# Patient Record
Sex: MALE | Race: WHITE | Employment: FULL TIME | URBAN - METROPOLITAN AREA
[De-identification: names, ages, dates, MRNs, and addresses within clinical notes are randomized per-mention and may not be internally consistent; named-entity substitution may affect disease eponyms.]

---

## 2018-04-11 ENCOUNTER — HOSPITAL ENCOUNTER (EMERGENCY)
Age: 58
Discharge: STILL ADMITTED OR ADMINISTRATIVE | End: 2018-04-11
Attending: EMERGENCY MEDICINE

## 2018-04-11 PROCEDURE — 75810000275 HC EMERGENCY DEPT VISIT NO LEVEL OF CARE: Performed by: EMERGENCY MEDICINE

## 2018-04-12 PROCEDURE — 75810000275 HC EMERGENCY DEPT VISIT NO LEVEL OF CARE: Performed by: EMERGENCY MEDICINE

## 2019-06-26 RX ORDER — SODIUM CHLORIDE 0.9 % (FLUSH) 0.9 %
5-40 SYRINGE (ML) INJECTION AS NEEDED
Status: CANCELLED | OUTPATIENT
Start: 2019-06-26

## 2019-06-26 RX ORDER — SODIUM CHLORIDE 0.9 % (FLUSH) 0.9 %
5-40 SYRINGE (ML) INJECTION EVERY 8 HOURS
Status: CANCELLED | OUTPATIENT
Start: 2019-06-26

## 2019-06-27 ENCOUNTER — ANESTHESIA EVENT (OUTPATIENT)
Dept: SURGERY | Age: 59
End: 2019-06-27
Payer: COMMERCIAL

## 2019-06-28 ENCOUNTER — HOSPITAL ENCOUNTER (OUTPATIENT)
Age: 59
Setting detail: OUTPATIENT SURGERY
Discharge: HOME | End: 2019-06-28
Attending: ORTHOPAEDIC SURGERY | Admitting: ORTHOPAEDIC SURGERY
Payer: COMMERCIAL

## 2019-06-28 ENCOUNTER — ANESTHESIA (OUTPATIENT)
Dept: SURGERY | Age: 59
End: 2019-06-28
Payer: COMMERCIAL

## 2019-06-28 VITALS
HEART RATE: 98 BPM | RESPIRATION RATE: 16 BRPM | OXYGEN SATURATION: 98 % | TEMPERATURE: 98 F | BODY MASS INDEX: 29.71 KG/M2 | SYSTOLIC BLOOD PRESSURE: 145 MMHG | WEIGHT: 213 LBS | DIASTOLIC BLOOD PRESSURE: 78 MMHG

## 2019-06-28 PROCEDURE — C1713 ANCHOR/SCREW BN/BN,TIS/BN: HCPCS | Performed by: ORTHOPAEDIC SURGERY

## 2019-06-28 PROCEDURE — C1762 CONN TISS, HUMAN(INC FASCIA): HCPCS | Performed by: ORTHOPAEDIC SURGERY

## 2019-06-28 PROCEDURE — 77030037278 HC KT QFIX DRL GDE OBTR DISP -C: Performed by: ORTHOPAEDIC SURGERY

## 2019-06-28 PROCEDURE — 77030002933 HC SUT MCRYL J&J -A: Performed by: ORTHOPAEDIC SURGERY

## 2019-06-28 PROCEDURE — 77030006788 HC BLD SAW OSC STRY -B: Performed by: ORTHOPAEDIC SURGERY

## 2019-06-28 PROCEDURE — 77030031139 HC SUT VCRL2 J&J -A: Performed by: ORTHOPAEDIC SURGERY

## 2019-06-28 PROCEDURE — 77030006590 HC BLD ARTHSC GRFT J&J -C: Performed by: ORTHOPAEDIC SURGERY

## 2019-06-28 PROCEDURE — 76010010054 HC POST OP PAIN BLOCK: Performed by: ORTHOPAEDIC SURGERY

## 2019-06-28 PROCEDURE — 74011250636 HC RX REV CODE- 250/636: Performed by: ANESTHESIOLOGY

## 2019-06-28 PROCEDURE — 76210000020 HC REC RM PH II FIRST 0.5 HR: Performed by: ORTHOPAEDIC SURGERY

## 2019-06-28 PROCEDURE — 74011250636 HC RX REV CODE- 250/636

## 2019-06-28 PROCEDURE — 76210000063 HC OR PH I REC FIRST 0.5 HR: Performed by: ORTHOPAEDIC SURGERY

## 2019-06-28 PROCEDURE — 77030004453 HC BUR SHV STRY -B: Performed by: ORTHOPAEDIC SURGERY

## 2019-06-28 PROCEDURE — 76010000171 HC OR TIME 2 TO 2.5 HR INTENSV-TIER 1: Performed by: ORTHOPAEDIC SURGERY

## 2019-06-28 PROCEDURE — 76060000035 HC ANESTHESIA 2 TO 2.5 HR: Performed by: ORTHOPAEDIC SURGERY

## 2019-06-28 PROCEDURE — 77030010430: Performed by: ORTHOPAEDIC SURGERY

## 2019-06-28 PROCEDURE — 77030027384 HC PRB ARTHSCP SERFAS STRY -C: Performed by: ORTHOPAEDIC SURGERY

## 2019-06-28 PROCEDURE — 77030006891 HC BLD SHV RESECT STRY -B: Performed by: ORTHOPAEDIC SURGERY

## 2019-06-28 PROCEDURE — 77030018836 HC SOL IRR NACL ICUM -A: Performed by: ORTHOPAEDIC SURGERY

## 2019-06-28 PROCEDURE — 77030002966 HC SUT PDS J&J -A: Performed by: ORTHOPAEDIC SURGERY

## 2019-06-28 PROCEDURE — 77030000032 HC CUF TRNQT ZIMM -B: Performed by: ORTHOPAEDIC SURGERY

## 2019-06-28 PROCEDURE — 74011250636 HC RX REV CODE- 250/636: Performed by: ORTHOPAEDIC SURGERY

## 2019-06-28 PROCEDURE — 76942 ECHO GUIDE FOR BIOPSY: CPT | Performed by: ORTHOPAEDIC SURGERY

## 2019-06-28 PROCEDURE — 77030002982 HC SUT POLYSRB J&J -A: Performed by: ORTHOPAEDIC SURGERY

## 2019-06-28 PROCEDURE — 77030018986 HC SUT ETHBND4 J&J -B: Performed by: ORTHOPAEDIC SURGERY

## 2019-06-28 PROCEDURE — 77030029203 HC IRR KT CYSTO/TUR BBMI -A: Performed by: ORTHOPAEDIC SURGERY

## 2019-06-28 PROCEDURE — 77030003690 HC NDL TAPR ASPN -A: Performed by: ORTHOPAEDIC SURGERY

## 2019-06-28 DEVICE — 7 X 20 MM BIORCI SCREW
Type: IMPLANTABLE DEVICE | Site: KNEE | Status: FUNCTIONAL
Brand: BIORCI

## 2019-06-28 DEVICE — 2.8MM Q-FIX ALL SUTURE ANCHOR
Type: IMPLANTABLE DEVICE | Site: KNEE | Status: FUNCTIONAL
Brand: Q-FIX

## 2019-06-28 DEVICE — GRAFT BNE L10XW25XH10MM BNE TEND BNE PRESHAPED: Type: IMPLANTABLE DEVICE | Site: KNEE | Status: FUNCTIONAL

## 2019-06-28 RX ORDER — DEXAMETHASONE SODIUM PHOSPHATE 4 MG/ML
INJECTION, SOLUTION INTRA-ARTICULAR; INTRALESIONAL; INTRAMUSCULAR; INTRAVENOUS; SOFT TISSUE AS NEEDED
Status: DISCONTINUED | OUTPATIENT
Start: 2019-06-28 | End: 2019-06-28 | Stop reason: HOSPADM

## 2019-06-28 RX ORDER — HYDROCODONE BITARTRATE AND ACETAMINOPHEN 5; 325 MG/1; MG/1
2 TABLET ORAL AS NEEDED
Status: DISCONTINUED | OUTPATIENT
Start: 2019-06-28 | End: 2019-06-28 | Stop reason: HOSPADM

## 2019-06-28 RX ORDER — MIDAZOLAM HYDROCHLORIDE 1 MG/ML
2 INJECTION, SOLUTION INTRAMUSCULAR; INTRAVENOUS
Status: DISCONTINUED | OUTPATIENT
Start: 2019-06-28 | End: 2019-06-28 | Stop reason: HOSPADM

## 2019-06-28 RX ORDER — SODIUM CHLORIDE, SODIUM LACTATE, POTASSIUM CHLORIDE, CALCIUM CHLORIDE 600; 310; 30; 20 MG/100ML; MG/100ML; MG/100ML; MG/100ML
75 INJECTION, SOLUTION INTRAVENOUS CONTINUOUS
Status: DISCONTINUED | OUTPATIENT
Start: 2019-06-28 | End: 2019-06-28 | Stop reason: HOSPADM

## 2019-06-28 RX ORDER — SODIUM CHLORIDE 0.9 % (FLUSH) 0.9 %
5-40 SYRINGE (ML) INJECTION EVERY 8 HOURS
Status: DISCONTINUED | OUTPATIENT
Start: 2019-06-28 | End: 2019-06-28 | Stop reason: HOSPADM

## 2019-06-28 RX ORDER — DEXAMETHASONE SODIUM PHOSPHATE 4 MG/ML
INJECTION, SOLUTION INTRA-ARTICULAR; INTRALESIONAL; INTRAMUSCULAR; INTRAVENOUS; SOFT TISSUE
Status: COMPLETED | OUTPATIENT
Start: 2019-06-28 | End: 2019-06-28

## 2019-06-28 RX ORDER — MIDAZOLAM HYDROCHLORIDE 1 MG/ML
5 INJECTION, SOLUTION INTRAMUSCULAR; INTRAVENOUS ONCE
Status: COMPLETED | OUTPATIENT
Start: 2019-06-28 | End: 2019-06-28

## 2019-06-28 RX ORDER — PROPOFOL 10 MG/ML
INJECTION, EMULSION INTRAVENOUS AS NEEDED
Status: DISCONTINUED | OUTPATIENT
Start: 2019-06-28 | End: 2019-06-28 | Stop reason: HOSPADM

## 2019-06-28 RX ORDER — CEFAZOLIN SODIUM/WATER 2 G/20 ML
2 SYRINGE (ML) INTRAVENOUS ONCE
Status: COMPLETED | OUTPATIENT
Start: 2019-06-28 | End: 2019-06-28

## 2019-06-28 RX ORDER — HYDROMORPHONE HYDROCHLORIDE 2 MG/ML
0.5 INJECTION, SOLUTION INTRAMUSCULAR; INTRAVENOUS; SUBCUTANEOUS
Status: DISCONTINUED | OUTPATIENT
Start: 2019-06-28 | End: 2019-06-28 | Stop reason: HOSPADM

## 2019-06-28 RX ORDER — SODIUM CHLORIDE 0.9 % (FLUSH) 0.9 %
5-40 SYRINGE (ML) INJECTION AS NEEDED
Status: DISCONTINUED | OUTPATIENT
Start: 2019-06-28 | End: 2019-06-28 | Stop reason: HOSPADM

## 2019-06-28 RX ORDER — FENTANYL CITRATE 50 UG/ML
100 INJECTION, SOLUTION INTRAMUSCULAR; INTRAVENOUS ONCE
Status: COMPLETED | OUTPATIENT
Start: 2019-06-28 | End: 2019-06-28

## 2019-06-28 RX ORDER — LIDOCAINE HYDROCHLORIDE 20 MG/ML
INJECTION, SOLUTION EPIDURAL; INFILTRATION; INTRACAUDAL; PERINEURAL AS NEEDED
Status: DISCONTINUED | OUTPATIENT
Start: 2019-06-28 | End: 2019-06-28 | Stop reason: HOSPADM

## 2019-06-28 RX ORDER — FENTANYL CITRATE 50 UG/ML
INJECTION, SOLUTION INTRAMUSCULAR; INTRAVENOUS AS NEEDED
Status: DISCONTINUED | OUTPATIENT
Start: 2019-06-28 | End: 2019-06-28 | Stop reason: HOSPADM

## 2019-06-28 RX ORDER — OXYCODONE HYDROCHLORIDE 5 MG/1
5 TABLET ORAL
Status: DISCONTINUED | OUTPATIENT
Start: 2019-06-28 | End: 2019-06-28 | Stop reason: HOSPADM

## 2019-06-28 RX ORDER — LIDOCAINE HYDROCHLORIDE 10 MG/ML
0.1 INJECTION INFILTRATION; PERINEURAL AS NEEDED
Status: DISCONTINUED | OUTPATIENT
Start: 2019-06-28 | End: 2019-06-28 | Stop reason: HOSPADM

## 2019-06-28 RX ORDER — ONDANSETRON 2 MG/ML
INJECTION INTRAMUSCULAR; INTRAVENOUS AS NEEDED
Status: DISCONTINUED | OUTPATIENT
Start: 2019-06-28 | End: 2019-06-28 | Stop reason: HOSPADM

## 2019-06-28 RX ADMIN — SODIUM CHLORIDE, SODIUM LACTATE, POTASSIUM CHLORIDE, AND CALCIUM CHLORIDE 75 ML/HR: 600; 310; 30; 20 INJECTION, SOLUTION INTRAVENOUS at 13:21

## 2019-06-28 RX ADMIN — DEXAMETHASONE SODIUM PHOSPHATE 4 MG: 4 INJECTION, SOLUTION INTRA-ARTICULAR; INTRALESIONAL; INTRAMUSCULAR; INTRAVENOUS; SOFT TISSUE at 09:47

## 2019-06-28 RX ADMIN — Medication 2 G: at 10:45

## 2019-06-28 RX ADMIN — DEXAMETHASONE SODIUM PHOSPHATE 10 MG: 4 INJECTION, SOLUTION INTRA-ARTICULAR; INTRALESIONAL; INTRAMUSCULAR; INTRAVENOUS; SOFT TISSUE at 10:52

## 2019-06-28 RX ADMIN — LIDOCAINE HYDROCHLORIDE 100 MG: 20 INJECTION, SOLUTION EPIDURAL; INFILTRATION; INTRACAUDAL; PERINEURAL at 10:34

## 2019-06-28 RX ADMIN — MIDAZOLAM 5 MG: 1 INJECTION INTRAMUSCULAR; INTRAVENOUS at 09:38

## 2019-06-28 RX ADMIN — SODIUM CHLORIDE, SODIUM LACTATE, POTASSIUM CHLORIDE, AND CALCIUM CHLORIDE: 600; 310; 30; 20 INJECTION, SOLUTION INTRAVENOUS at 11:31

## 2019-06-28 RX ADMIN — FENTANYL CITRATE 50 MCG: 50 INJECTION INTRAMUSCULAR; INTRAVENOUS at 09:38

## 2019-06-28 RX ADMIN — DEXAMETHASONE SODIUM PHOSPHATE 10 MG: 4 INJECTION, SOLUTION INTRA-ARTICULAR; INTRALESIONAL; INTRAMUSCULAR; INTRAVENOUS; SOFT TISSUE at 10:50

## 2019-06-28 RX ADMIN — FENTANYL CITRATE 25 MCG: 50 INJECTION, SOLUTION INTRAMUSCULAR; INTRAVENOUS at 11:57

## 2019-06-28 RX ADMIN — SODIUM CHLORIDE, SODIUM LACTATE, POTASSIUM CHLORIDE, AND CALCIUM CHLORIDE 75 ML/HR: 600; 310; 30; 20 INJECTION, SOLUTION INTRAVENOUS at 09:14

## 2019-06-28 RX ADMIN — PROPOFOL 200 MG: 10 INJECTION, EMULSION INTRAVENOUS at 10:34

## 2019-06-28 RX ADMIN — FENTANYL CITRATE 25 MCG: 50 INJECTION, SOLUTION INTRAMUSCULAR; INTRAVENOUS at 12:17

## 2019-06-28 RX ADMIN — SODIUM CHLORIDE, SODIUM LACTATE, POTASSIUM CHLORIDE, AND CALCIUM CHLORIDE: 600; 310; 30; 20 INJECTION, SOLUTION INTRAVENOUS at 10:28

## 2019-06-28 RX ADMIN — FENTANYL CITRATE 25 MCG: 50 INJECTION, SOLUTION INTRAMUSCULAR; INTRAVENOUS at 11:26

## 2019-06-28 RX ADMIN — ONDANSETRON 4 MG: 2 INJECTION INTRAMUSCULAR; INTRAVENOUS at 10:52

## 2019-06-28 RX ADMIN — FENTANYL CITRATE 25 MCG: 50 INJECTION, SOLUTION INTRAMUSCULAR; INTRAVENOUS at 10:50

## 2019-06-28 NOTE — BRIEF OP NOTE
BRIEF OPERATIVE NOTE Date of Procedure: 6/28/2019 Preoperative Diagnosis: Sprain of anterior cruciate ligament of right knee, initial encounter [O34.426O] Sprain of medial collateral ligament of right knee, initial encounter [S08.743H] Complex tear of lateral meniscus of right knee as current injury, initial encounter [S83.271A] Postoperative Diagnosis: Tear of anterior cruciate ligament of right knee, initial encounter [E16.755J] Tear of medial collateral ligament of right knee, initial encounter [L29.840E] Complex tear of lateral meniscus of right knee as current injury, initial encounter [S83.271A] Procedure(s): RIGHT KNEE ARTHROSCOPY WITH ANTERIOR CRUCIATE LIGAMENT RECONSTRUCTION WITH ALLOGRAFT / MEDIAL COLLATERAL LIGAMENT REPAIR / LATERAL MENISCECTOMY Surgeon(s) and Role: Master García MD - Primary Surgical Assistant:  
 
 
 
Surgical Staff: 
Circ-1: Jason Wade RN 
Circ-Relief: Queen Renny RN Scrub Tech-1: Muriel Moncada Scrub Tech-2: Stephen Chao Event Time In Time Out Incision Start 1055 Incision Close 1220 Anesthesia: General  
Estimated Blood Loss:  
 
Specimens: * No specimens in log * Findings:  
  
Complications:  
 
Implants:  
Implant Name Type Inv. Item Serial No.  Lot No. LRB No. Used Action BONE TEND BNE [facility] PRE [facility] -- 10/10/35-45MM - [id number]  BONE TEND BNE [facility] PRE [facility] -- 10/10/35-45MM [id number] [employer] [id number] Right 1 Implanted SCR INTFR Vonita Schiller --  - [id number]  SCR INTFR Augustin Javi [employer] [id number] Right 1 Implanted SCR INTFR Vonita Schiller --  - [id number]  SCR INTFR [facility] Javi [employer] [id number] Right 1 Implanted ANCHOR SUT EXP DISP 2.8MM STRL -- Q-FIX - [id number]  ANCHOR SUT EXP DISP 2.8MM STRL -- [place]azal [place] [employer] [id number] Right 1 Implanted

## 2019-06-28 NOTE — OP NOTES
[facility]  OPERATIVE REPORT    Name:  Mellisa Robbins  MR#:  [id number]  :  1960  ACCOUNT #:  [de-identified]  DATE OF SERVICE:  2019    PREOPERATIVE DIAGNOSES:  Right anterior cruciate ligament tear with medial collateral ligament tear, and bucket-handle tear of the lateral meniscus. 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 PROCEDURE PERFORMED:  1. Arthroscopic-assisted ACL reconstruction using central third of the bone-tendon-bone allograft. 2.  Repair of the medial collateral ligament. 3.  Lateral meniscectomy. 4.  Chondroplasty of medial femoral condyle. SURGEON:  Donna Roche MD    ASSISTANT:  None. ANESTHESIA:  General.    COMPLICATIONS:  None. SPECIMENS REMOVED:  None. IMPLANTS:  None. ESTIMATED BLOOD LOSS:  20 mL    PROCEDURE:  After an adequate level of general anesthesia was obtained, the right leg was prepped and draped in the usual sterile fashion. A tourniquet was used for the procedure. He had a block per Anesthesia for postop pain management. He had a tear of the MCL and ACL with a markedly positive anterior drawer and Lachman. It opened some at the extension. He had no posterior sagging or increased external rotation. He had an avulsion of the MCL off the femur but he did have good range of motion. The knee was arthroscoped and the patient had just minimal chondromalacia of the undersurface of the patella. This was performed through anteromedial and anterolateral portals. The patient had a complete tear of the ACL. He had a large displaced bucket-handle tear from the anterior horn to the posterior horn that flipped into the joint, and unfortunately, it had a extended into the periphery. It was not repairable. It was more of a complex tear.   It was released at the axillas and then removed. There was just minimal chondromalacia laterally. There were no retained stumps. A notchplasty was performed. The old ACL was debrided. The ACL footprint at the over-the-top position was identified. 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 The patient also had an MCL tear off the femur. The graft was prepared on the back table. The over-the-top position and posterior wall identified as well as the ACL footprint and the graft was prepared on the back table. A tibial tunnel was drilled to 10 mm. He had a very sharp spine in the intercondylar notch that was removed to facilitate exposure. The tibial tunnel was drilled to 10 mm. The edges were chamfered. Just anterior to the posterior cortex, a guidewire was placed and drilled to 10 mm. Soft tissue and debris was then removed and the graft had been prepared on the back table using an allograft central third of the bone-tendon-bone. The graft was pulled snuggly into the two sites and excellent fixation with Smith and Nephew absorbable suture anchors. There was excellent fixation on both ends and this stabilized the knee significantly, even improving some of the laxity in full extension and at 30 degrees with valgus stress. He is very stable. Photos were made for the patient. The wound was then copiously irrigated. Then I elected to repair the MCL which was avulsed off the femur. An incision was made over the medial epicondyle area. Dissection was carried down through the subcutaneous tissue. The fascia was opened and then the tear of the MCL off the femur was exposed. It was elected perform a repair using the [facility] quick fix system. Suture anchor was placed snuggly into the bone. This was double loaded. Sutures were then placed into the MCL and repair was performed.   His knee was much more stable after the repair and there was good tension on the MCL and this was performed to the knee in about 30-40 degrees. His knee was stable in extension and at flexion. Once again, at the end of the procedure, the ACL reconstruction was evaluated and was very stable. The tourniquet was released. Hemostasis was obtained. The deep tissue was closed with Vicryl, subcutaneous tissue with Monocryl, and Monocryl subcuticular stitches placed. A running subcuticular nylon suture was placed. Steri-Strips and sterile dressings were then applied. The leg was placed in a hinged knee brace locked in full extension. Instructions were given to family. He tolerated the procedure well.       MD CHLOE [id number]  D:  06/28/2019 12:30  T:  06/28/2019 12:39  JOB #:  [id number]  CC:  [id number] [facility]

## 2019-06-28 NOTE — ANESTHESIA POSTPROCEDURE EVALUATION
Procedure(s):  RIGHT KNEE ARTHROSCOPY WITH ANTERIOR CRUCIATE LIGAMENT RECONSTRUCTION WITH ALLOGRAFT / MEDIAL COLLATERAL LIGAMENT REPAIR / LATERAL MENISCECTOMY. general    Anesthesia Post Evaluation      Multimodal analgesia: multimodal analgesia used between 6 hours prior to anesthesia start to PACU discharge  Patient location during evaluation: bedside  Patient participation: complete - patient participated  Level of consciousness: awake and alert  Pain score: 3  Pain management: adequate  Airway patency: patent  Anesthetic complications: no  Cardiovascular status: acceptable and hemodynamically stable  Respiratory status: acceptable  Hydration status: acceptable  Post anesthesia nausea and vomiting:  none      Vitals Value Taken Time   /66 6/28/2019  1:51 PM   Temp 36.6 °C (97.9 °F) 6/28/2019 12:42 PM   Pulse 90 6/28/2019  1:51 PM   Resp 16 6/28/2019  1:11 PM   SpO2 96 % 6/28/2019  1:51 PM   Vitals shown include unvalidated device data.

## 2019-06-28 NOTE — H&P
Outpatient Surgery History and Physical:  Lani Zarco was seen and examined. CHIEF COMPLAINT:    r knee . PE:   There were no vitals taken for this visit. Heart:   Regular rhythm      Lungs:  Are clear      Past Medical History: There are no active problems to display for this patient. Surgical History: No past surgical history on file. Social History: Patient  reports that he has never smoked. He has never used smokeless tobacco. He reports that he does not drink alcohol or use drugs. Family History:   Family History   Problem Relation Age of Onset    Heart Disease Father        Allergies: Reviewed per EMR  Allergies   Allergen Reactions    Pcn [Penicillins] Other (comments)     Headaches         Medications:    No current facility-administered medications on file prior to encounter. Current Outpatient Medications on File Prior to Encounter   Medication Sig    aspirin 81 mg tablet Take 81 mg by mouth. The surgery is planned for the  knee. History and physical has been reviewed. The patient has been examined. There have been no significant clinical changes since the completion of the originally dated History and Physical.  Patient identified by surgeon; surgical site was confirmed by patient and surgeon. The patient is here today for outpatient surgery. I have examined the patient, no changes are noted in the patient's medical status. Necessity for the procedure/care is still present and the history and physical above is current. See the office notes for the full long term history of the problem. Please see the recent office notes for the musculoskeletal examination.     Signed By: Pardeep Castellanos MD     June 28, 2019 6:55 AM

## 2019-06-28 NOTE — DISCHARGE INSTRUCTIONS
ACTIVITY  · As tolerated and as directed by your doctor. · Bathe or shower as directed by your doctor. DIET  · Clear liquids until no nausea or vomiting; then light diet for the first day. · Advance to regular diet on second day, unless your doctor orders otherwise. · If nausea and vomiting continues, call your doctor. PAIN  · Take pain medication as directed by your doctor. · Call your doctor if pain is NOT relieved by medication. · DO NOT take aspirin of blood thinners unless directed by your doctor. DRESSING CARE       CALL YOUR DOCTOR IF   · Excessive bleeding that does not stop after holding pressure over the area  · Temperature of 101 degrees F or above  · Excessive redness, swelling or bruising, and/ or green or yellow, smelly discharge from incision    AFTER ANESTHESIA   · For the first 24 hours: DO NOT Drive, Drink alcoholic beverages, or Make important decisions. · Be aware of dizziness following anesthesia and while taking pain medication. APPOINTMENT DATE/ TIME    YOUR DOCTOR'S PHONE NUMBER       DISCHARGE SUMMARY from Nurse    PATIENT INSTRUCTIONS:    After general anesthesia or intravenous sedation, for 24 hours or while taking prescription Narcotics:  · Limit your activities  · Do not drive and operate hazardous machinery  · Do not make important personal or business decisions  · Do  not drink alcoholic beverages  · If you have not urinated within 8 hours after discharge, please contact your surgeon on call. *  Please give a list of your current medications to your Primary Care Provider. *  Please update this list whenever your medications are discontinued, doses are      changed, or new medications (including over-the-counter products) are added. *  Please carry medication information at all times in case of emergency situations.       These are general instructions for a healthy lifestyle:    No smoking/ No tobacco products/ Avoid exposure to second hand smoke    Surgeon General's Warning:  Quitting smoking now greatly reduces serious risk to your health. Obesity, smoking, and sedentary lifestyle greatly increases your risk for illness    A healthy diet, regular physical exercise & weight monitoring are important for maintaining a healthy lifestyle    You may be retaining fluid if you have a history of heart failure or if you experience any of the following symptoms:  Weight gain of 3 pounds or more overnight or 5 pounds in a week, increased swelling in our hands or feet or shortness of breath while lying flat in bed. Please call your doctor as soon as you notice any of these symptoms; do not wait until your next office visit. Recognize signs and symptoms of STROKE:    F-face looks uneven    A-arms unable to move or move unevenly    S-speech slurred or non-existent    T-time-call 911 as soon as signs and symptoms begin-DO NOT go       Back to bed or wait to see if you get better-TIME IS BRAIN. Post-Operative Instructions   For  Anterior Cruciate Ligament Reconstruction  Phone:  [phone]    1. Unless otherwise instructed, you may place as much weight as tolerated on the operated leg. Use crutches to help with ambulation. 2.  If you do not have an \"Iceman\" type cooling unit, for the first 48-72 hours following surgery, use ice on the knee every two hours (while awake) for 20-30 minutes at a time to help prevent swelling and lessen pain. If you have a cooling unit, follow the instructions given to you- continually as much as possible the first 48-72 hours, then 3-4 times a day for 4 weeks. Elevate leg. 3. You have been given a hinged brace. Keep the brace locked in a straight position at all times until you begin therapy. 4. You may remove the brace to shower and wrap the dressings to keep them dry. 5. Use any pain medication as instructed. You should take your pain medication as soon as you feel the anesthetic wearing off.   Do not wait until you are in severe pain to begin taking your pain medication. 6. You may have some side effects from your pain medication. If you have nausea, try taking your medication with food. For itching, you may take over the counter Benadryl. 7. Begin therapy as ordered    8. You may have been given a prescription for Zofran or Phenergan. This medication is used for nausea and vomiting. You do not need to get this prescription filled unless you have a problem. 9. If you have a problem, please call [address] [place] [address] at [phone]    Ruma Adame MD    [address] [place] [address], P.A[facility] Post-Operative Instructions   For  Anterior Cruciate Ligament Reconstruction  Phone:  [phone]    1. Unless otherwise instructed, you may place as much weight as tolerated on the operated leg. Use crutches to help with ambulation. 2.  If you do not have an \"Iceman\" type cooling unit, for the first 48-72 hours following surgery, use ice on the knee every two hours (while awake) for 20-30 minutes at a time to help prevent swelling and lessen pain. If you have a cooling unit, follow the instructions given to you- continually as much as possible the first 48-72 hours, then 3-4 times a day for 4 weeks. Elevate leg. 3. You have been given a hinged brace. Keep the brace locked in a straight position at all times until you begin therapy. 4. You may remove the brace to shower and wrap the dressings to keep them dry. 5. Use any pain medication as instructed. You should take your pain medication as soon as you feel the anesthetic wearing off. Do not wait until you are in severe pain to begin taking your pain medication. 6. You may have some side effects from your pain medication. If you have nausea, try taking your medication with food. For itching, you may take over the counter Benadryl. 7. Begin therapy as ordered    8.  You may have been given a prescription for Zofran or Phenergan. This medication is used for nausea and vomiting. You do not need to get this prescription filled unless you have a problem. 9. If you have a problem, please call [address] [place] [address] at [phone]    Edward Wolff MD    [facility]

## 2019-06-28 NOTE — PERIOP NOTES
Instructions to wife and daughter. Right knee dressing is dry and intact. brace is dry and intact. Still no feelings or sensations to the right leg. bp is 145/76 upon discharge. Jocelyn Sneed Pt. Verbalize feeling better. skin is dry and warm to touch .

## 2019-06-28 NOTE — ANESTHESIA PROCEDURE NOTES
Peripheral Block    Start time: 6/28/2019 9:46 AM  End time: 6/28/2019 9:48 AM  Performed by: Marten Hammans., MD  Authorized by: Marten Hammans., MD       Pre-procedure:    Indications: at surgeon's request, post-op pain management and procedure for pain    Preanesthetic Checklist: patient identified, risks and benefits discussed, site marked, timeout performed, anesthesia consent given and patient being monitored    Timeout Time: 09:45          Block Type:   Block Type:  Femoral single shot  Laterality:  Right  Monitoring:  Standard ASA monitoring, responsive to questions, oxygen, continuous pulse ox, frequent vital sign checks and heart rate  Injection Technique:  Single shot  Procedures: ultrasound guided and nerve stimulator    Patient Position: supine  Prep: chlorhexidine    Location:  Upper thigh  Needle Type:  Stimuplex  Needle Gauge:  22 G  Needle Localization:  Anatomical landmarks, nerve stimulator and ultrasound guidance  Motor Response: minimal motor response >0.4 mA      Assessment:  Number of attempts:  1  Injection Assessment:  Ultrasound image on chart, no paresthesia, incremental injection every 5 mL, local visualized surrounding nerve on ultrasound, negative aspiration for blood and no intravascular symptoms  Patient tolerance:  Patient tolerated the procedure well with no immediate complications

## 2019-06-28 NOTE — ANESTHESIA PREPROCEDURE EVALUATION
Relevant Problems   No relevant active problems       Anesthetic History   No history of anesthetic complications            Review of Systems / Medical History  Patient summary reviewed and pertinent labs reviewed    Pulmonary  Within defined limits                 Neuro/Psych   Within defined limits           Cardiovascular  Within defined limits                Exercise tolerance: >4 METS     GI/Hepatic/Renal  Within defined limits              Endo/Other  Within defined limits           Other Findings              Physical Exam    Airway  Mallampati: II  TM Distance: 4 - 6 cm  Neck ROM: normal range of motion   Mouth opening: Normal     Cardiovascular  Regular rate and rhythm,  S1 and S2 normal,  no murmur, click, rub, or gallop             Dental         Pulmonary  Breath sounds clear to auscultation               Abdominal         Other Findings            Anesthetic Plan    ASA: 1  Anesthesia type: general      Post-op pain plan if not by surgeon: peripheral nerve block single    Induction: Intravenous  Anesthetic plan and risks discussed with: Patient and Spouse

## 2019-06-28 NOTE — ANESTHESIA PROCEDURE NOTES
Peripheral Block    Start time: 6/28/2019 9:40 AM  End time: 6/28/2019 9:44 AM  Performed by: Bassem Pierce MD  Authorized by: Bassem Pierce MD       Pre-procedure:    Indications: at surgeon's request, post-op pain management and procedure for pain    Preanesthetic Checklist: patient identified, risks and benefits discussed, site marked, timeout performed, anesthesia consent given and patient being monitored    Timeout Time: 09:38          Block Type:   Block Type:  Sciatic single shot  Laterality:  Right  Monitoring:  Standard ASA monitoring, responsive to questions, oxygen, continuous pulse ox, frequent vital sign checks and heart rate  Injection Technique:  Single shot  Procedures: ultrasound guided and nerve stimulator    Patient Position: right lateral decubitus  Prep: chlorhexidine    Location:  Mid thigh  Needle Type:  Stimuplex  Needle Gauge:  22 G  Needle Localization:  Nerve stimulator and ultrasound guidance  Motor Response: minimal motor response >0.4 mA      Assessment:  Number of attempts:  1  Injection Assessment:  Incremental injection every 5 mL, no paresthesia, ultrasound image on chart, no intravascular symptoms, negative aspiration for blood and local visualized surrounding nerve on ultrasound  Patient tolerance:  Patient tolerated the procedure well with no immediate complications

## 2019-12-05 PROBLEM — E78.2 MIXED HYPERLIPIDEMIA: Status: ACTIVE | Noted: 2019-12-05

## 2020-01-06 ENCOUNTER — HOSPITAL ENCOUNTER (OUTPATIENT)
Dept: SLEEP MEDICINE | Age: 60
Discharge: HOME | End: 2020-01-06
Payer: COMMERCIAL

## 2020-01-06 PROCEDURE — 95810 POLYSOM 6/> YRS 4/> PARAM: CPT

## 2020-01-07 ENCOUNTER — HOSPITAL ENCOUNTER (OUTPATIENT)
Dept: SLEEP MEDICINE | Age: 60
Discharge: HOME | End: 2020-01-07
Payer: COMMERCIAL

## 2020-01-07 PROCEDURE — 95811 POLYSOM 6/>YRS CPAP 4/> PARM: CPT

## 2020-01-22 PROBLEM — G47.33 OSA (OBSTRUCTIVE SLEEP APNEA): Status: ACTIVE | Noted: 2020-01-22

## 2020-01-22 PROBLEM — J30.9 ALLERGIC RHINITIS: Status: ACTIVE | Noted: 2020-01-22

## 2020-01-23 PROBLEM — R03.0 ELEVATED BLOOD-PRESSURE READING WITHOUT DIAGNOSIS OF HYPERTENSION: Status: ACTIVE | Noted: 2020-01-23

## 2020-01-23 PROBLEM — J32.9 RECURRENT SINUS INFECTIONS: Status: ACTIVE | Noted: 2020-01-23

## 2020-01-31 ENCOUNTER — HOSPITAL ENCOUNTER (OUTPATIENT)
Dept: CT IMAGING | Age: 60
Discharge: HOME | End: 2020-01-31
Attending: FAMILY MEDICINE
Payer: COMMERCIAL

## 2020-01-31 DIAGNOSIS — J32.9 RECURRENT SINUS INFECTIONS: ICD-10-CM

## 2020-01-31 PROCEDURE — 70486 CT MAXILLOFACIAL W/O DYE: CPT

## 2020-02-02 NOTE — PROGRESS NOTES
-L sphenoid sinus completely filled with fluid, pls Rx cefdinir 300mg BID x 10d and follow up with ENT as scheduled. Continue with sinus rinses and flonase as well.

## 2020-03-03 PROBLEM — E66.9 OBESITY (BMI 30-39.9): Status: ACTIVE | Noted: 2020-03-03

## 2021-05-13 PROBLEM — R73.01 IFG (IMPAIRED FASTING GLUCOSE): Status: ACTIVE | Noted: 2021-05-13

## 2022-03-18 PROBLEM — R73.01 IFG (IMPAIRED FASTING GLUCOSE): Status: ACTIVE | Noted: 2021-05-13

## 2022-03-18 PROBLEM — G47.33 OSA (OBSTRUCTIVE SLEEP APNEA): Status: ACTIVE | Noted: 2020-01-22

## 2022-03-19 PROBLEM — E66.9 OBESITY (BMI 30-39.9): Status: ACTIVE | Noted: 2020-03-03

## 2022-03-19 PROBLEM — J32.9 RECURRENT SINUS INFECTIONS: Status: ACTIVE | Noted: 2020-01-23

## 2022-03-19 PROBLEM — E78.2 MIXED HYPERLIPIDEMIA: Status: ACTIVE | Noted: 2019-12-05

## 2022-03-20 PROBLEM — J30.9 ALLERGIC RHINITIS: Status: ACTIVE | Noted: 2020-01-22

## 2022-03-20 PROBLEM — R03.0 WHITE COAT SYNDROME WITHOUT DIAGNOSIS OF HYPERTENSION: Status: ACTIVE | Noted: 2020-01-23

## 2022-11-29 ENCOUNTER — OFFICE VISIT (OUTPATIENT)
Dept: ORTHOPEDIC SURGERY | Age: 62
End: 2022-11-29

## 2022-11-29 VITALS — HEIGHT: 69 IN | WEIGHT: 214 LBS | BODY MASS INDEX: 31.7 KG/M2

## 2022-11-29 DIAGNOSIS — S83.92XA SPRAIN OF LEFT KNEE, UNSPECIFIED LIGAMENT, INITIAL ENCOUNTER: Primary | ICD-10-CM

## 2022-11-29 NOTE — PROGRESS NOTES
Name: Puja Rodriguez  YOB: 1960  Gender: male  MRN: [id number]      What: Left knee twisting injury and low back pain  How: Fall and a twisting injury roughly 2 feet  When: 11/23/2022        HPI: Puja Rodriguez is a 65 y.o. male seen for left knee and low back pain. He denies any previous left knee problems. He denies any previous back problems. He sustained a twisting left knee injury from a fall roughly 2 feet. He also landed on his back. The knee twisted, he felt a pop, the knee swelled up. He also fell on his buttocks and injured his back. The knee feels unstable. He is 3-1/2 years status post arthroscopy of the right knee ACL reconstruction utilizing allograft bone patella tendon bone graft MCL repair and partial lateral meniscectomy by Dr. Yee Dorman from which she had an excellent result. He is very active. ROS/Meds/PSH/PMH/FH/SH: A ten system review of systems was performed and is negative other than what is in the HPI. Tobacco:  reports that he has never smoked. He has never used smokeless tobacco.  Ht 5' 9\" (1.753 m)   Wt 214 lb (97.1 kg)   BMI 31.60 kg/m²      Physical Examination:  He is an awake alert gentleman ambulating with an antalgic gait on the left side    His right knee is well-healed incisions  The right knee has a range of motion of 0 to 135 degrees  negative Lachman,  negative anterior drawer,   negative posterior drawer  negative pivot. Good tibial step-off,   No varus or valgus laxity at 0 or 30 degrees. Negative lateral joint line tenderness   negative lateral Vik. Negative medial joint line tenderness  negative medial Vik. No evidence of any posterolateral instability. No patellofemoral pain. Negative compression,   negative apprehension  no effusion. Calves Are non-tender,  neurovascularly patient is intact. Negative Homans. MPFL is non-tender. Patient Can fully extend the knee. Good quad tone  No erythema. Negative Dial test.    The left knee has a range of motion of 0 to 125 degrees  2+ Lachman,  2+ anterior drawer,   negative posterior drawer  Trace pivot. Good tibial step-off,   No varus or valgus laxity at 0 or 30 degrees. Negative lateral joint line tenderness   negative lateral Vik. Positive medial joint line tenderness  Positive medial Vik. No evidence of any posterolateral instability. No patellofemoral pain. Negative compression,   negative apprehension  Trace effusion. Calves Are non-tender,  neurovascularly patient is intact. Negative Homans. MPFL is non-tender. Patient Can fully extend the knee. Good quad tone  No erythema. Negative Dial test.            Data Reviewed:      XR: AP standing PA 45 degree weightbearing lateral and sunrise views both knees   Clinical Indication    ICD-10-CM    1. Sprain of left knee, unspecified ligament, initial encounter  S83. 92XA MRI KNEE LEFT WO CONTRAST         Report: AP standing PA 45 degree bearing lateral and sunrise views both knees demonstrate observe joint space in all 3 compartments. No fracture. No dislocation. Impression: As above   Viji Antoine MD                Impression:   1. Sprain of left knee, unspecified ligament, initial encounter       Rule out internal derangement left knee  Low back sprain  Status post arthroscopy of the right knee ACL reconstruction utilizing allograft bone patella tendon bone graft, MCL repair, partial lateral meniscectomy, chondroplasty of the medial femoral condyle 3.5 years by Dr. Jerri Nguyen  BMI of over 31    Plan:   I discussed the problem with the patient. I discussed nonoperative versus operative intervention. Specifically I discussed the potential need for ACL reconstruction of the left knee given his activity level. He has a brace he will wear it. In regards to his back I will refer him to the spine team [facility].   I would like to obtain an MRI of the left knee and I will reassess his progress back from the MRI of the left knee and will discuss operative options  4 This is an acute complicated injury    Follow up: No follow-ups on file.            Perla Banks MD

## 2022-12-05 DIAGNOSIS — S83.92XA SPRAIN OF LEFT KNEE, UNSPECIFIED LIGAMENT, INITIAL ENCOUNTER: ICD-10-CM

## 2022-12-06 ENCOUNTER — TELEPHONE (OUTPATIENT)
Dept: ORTHOPEDIC SURGERY | Age: 62
End: 2022-12-06

## 2022-12-06 NOTE — TELEPHONE ENCOUNTER
Called EmilyPanda to inform him of [place] [address] emergency surgery this afternoon. Informed him we will call him back on Thursday to reschedule his appointment.

## 2022-12-13 ENCOUNTER — OFFICE VISIT (OUTPATIENT)
Dept: ORTHOPEDIC SURGERY | Age: 62
End: 2022-12-13
Payer: COMMERCIAL

## 2022-12-13 VITALS — HEIGHT: 71 IN | BODY MASS INDEX: 29.85 KG/M2

## 2022-12-13 DIAGNOSIS — S83.512D RUPTURE OF ANTERIOR CRUCIATE LIGAMENT OF LEFT KNEE, SUBSEQUENT ENCOUNTER: Primary | ICD-10-CM

## 2022-12-13 PROCEDURE — 99214 OFFICE O/P EST MOD 30 MIN: CPT | Performed by: ORTHOPAEDIC SURGERY

## 2022-12-13 NOTE — LETTER
DME Patient Authorization Form    Name: Tyler Zamarripa  : 1960  MRN: [id number]   Age: 65 y.o. Gender: male  Delivery Address: [facility]     Diagnosis:     ICD-10-CM    1. Rupture of anterior cruciate ligament of left knee, subsequent encounter  S83.512D            Requested DME:  [id number] ($71.00) X 1 - left        Clinical Notes:     **Indicates non-covered items by insurance. Payment expected on date of service. Electronically signed by  Provider: Marlow Babinski, MD__Date: 2022                            [facility] [address] [place] [address] Tax ID # [id number]        Durable Medical Equipment and/or Orthotics Patient Consent     I understand that my physician has prescribed this medical supply as part of my treatment plan as a matter of Medical Necessity.  I understand that I have a choice in where I receive my prescribed orthopedic supplies and/or services.  I authorize [facility] to furnish this service/product and to provide my insurance carrier with any information requested in order to process for payment.  I instruct my insurance carrier to pay [facility] directly for these services/products.  I understand that my insurance carrier may deny payment for this supply because it is a non-covered item, deemed not medically necessary or considered experimental.   I understand that any cost not covered by my insurance carrier will be solely my financial responsibility.  I have received the Supplier Standards and have reviewed them.  I have received the prescribed item and have been fully instructed on the proper use of the above services/products.    ______ (Patient Initials) I understand that all DME items are non-returnable after being dispensed. Items still in sealed packaging may be returned up to 14 days after purchasing.  [facility] will replace items that are defective.    ______ (Patient Initials) I understand that [facility] will not file a claim with my insurance carrier for this service/product and I am waiving my right to file a claim on my own for this service/product with my insurance company as this item is NON-COVERED (Denoted by the **) by my Insurance company/policy. ______ (Patient Initials) I understand that I am responsible to bring my equipment to the hospital for any surgery. _________________________________                       ________________________    Patient / [place] [place]            Thank you for considering [address] Your physician has prescribed specific medical equipment or devices for your home use. The following describes your rights and responsibilities as our customer. Right to Choose Providers: You have a choice regarding which company supplies your home medical equipment and devices, and to consult your physician in this decision. You may choose a medical supply store, a home medical equipment provider, or a specialist such as [facility]. [facility] will coordinate with your physician to provide the medical equipment or devices prescribed for your home use. Right to Service:  You have the right to considerate, respectful and nondiscriminatory care. You have the right to receive accurate and easily understood information about your health care. If you speak a foreign language, or don't understand the discussions, assistance will be provided to allow you to make informed health care decisions. You have the right to know your treatment options and to participate in decisions about your care, including the right to accept or refuse treatment. You have the right to expect a reasonable response to your requests for treatment or service.   You have the right to talk in confidence with health care providers and to have your health care information protected. You have the right to receive an explanation of your bill. You have the right to complain about the service or product you receive. Patient Responsibilities:  Please provide complete and accurate information about your health insurance benefits and make arrangements for the timely payment of your bill. [facility] will, if possible, assume responsibility for billing your insurance (Medicare, Medicaid and commercial) for the prescribed equipment or devices. If your policy does not cover the prescribed product, or only covers a portion of the bill, you are responsible for any remaining balance. Return and Exchange Policy:  [facility] will honor published  Warranties for products. [facility] will accept returns or exchanges within 14 days from the date of receipt, providin) the product must be in new condition; 2) receipt as required; and 3) used disposable and hygiene products may only be returned due to a defective product. Note: Refunds will be issued in a timely manner, please allow 4-6 weeks for processing. Complaint Procedures and DME Consumer Protection Resources:  [facility] values you as a customer, and is committed to resolving patient concerns. This commitment includes understanding and documenting your concerns, conducting a review of internal procedures, and providing you with an explanation and resolution to your concerns. Should you have any questions about our services or billing process, please contact our office at (practice phone number). If we are unable to resolve the concern, you have the right to direct comments to the office of Consumer Protection, in the [address] or the [facility] office, without fear of repercussion.     DMEPOS SUPPLIER STANDARDS    A supplier must be in compliance with all applicable Federal and State licensure and regulatory requirements. A supplier must provide complete and accurate information on the DMEPOS supplier application. Any changes to this information must be reported to the [facility] [employer] within 30 days. An authorized individual (one whose signature is binding) must sign the application for billing privileges. A supplier must fill orders from its own inventory, or must contract with other companies for the purchase of items necessary to fill the order. A supplier may not contract with any entity that is currently excluded from the Medicare program, any [facility] program, or from any other Federal procurement or Nonprocurement programs. A supplier must advise beneficiaries that they may rent or purchase inexpensive or routinely purchased durable medical equipment, and of the purchase option for capped rental equipment. A supplier must notify beneficiaries of warranty coverage and honor all warranties under applicable State Law, and repair or replace free of charge Medicare covered items that are under warranty. A supplier must maintain a physical facility on an appropriate site. A supplier must permit CMS, or its agents to conduct on-site inspections to ascertain the supplier's compliance with these standards. The supplier location must be accessible to beneficiaries during reasonable business hours, and must maintain a visible sign and posted hours of operation. A supplier must maintain a primary business telephone listed under the name of the business in a [employer] or a toll free number available through directory assistance. The exclusive use of a beeper, answering machine or cell phone is prohibited. A supplier must have comprehensive liability insurance in the amount of at least $300,000 that covers both the supplier's place of business and all customers and employees of the supplier.   If the supplier manufactures its own items, this insurance must also cover product liability and completed operations. A supplier must agree not to initiate telephone contact with beneficiaries, with a few exceptions allowed. This standard prohibits suppliers from calling beneficiaries in order to solicit new business. A supplier is responsible for delivery and must instruct beneficiaries on use of Medicare covered items, and maintain proof of delivery. A supplier must answer questions, and respond to complaints of the beneficiaries, and maintain documentation of such contacts. A supplier must maintain and replace at no charge or repair directly, or through a service contract with another company, Medicare covered items it has rented to beneficiaries. A supplier must accept returns of substandard (less than full quality for the particular item) or unsuitable items (inappropriate for the beneficiary at the time it was fitted and rented or sold) from beneficiaries. A supplier must disclose these supplier standards to each beneficiary to whom it supplies a Medicare-covered item. A supplier must disclose to the government any person having ownership, financial, or control interest in the supplier. A supplier must not convey or reassign a supplier number; i.e., the supplier may not sell or allow another entity to use its Medicare billing number. A supplier must have a complaint resolution protocol established to address beneficiary complaints that relate to these standards. A record of these complaints must be maintained at the physical facility. Complaint records must include: the name, address, telephone number and health insurance claim number of the beneficiary, a summary of the complaint, and any action taken to resolve it. A supplier must agree to furnish CMS any information required by the Medicare statute and implementing regulations.   A supplier of DMEPOS and other items and services must be accredited by a CMS-approved accreditation organization in order to receive and retain a supplier billing number. The accreditation must indicate the specific products and services, for which the supplier is accredited in order for the supplier to receive payment for those specific products and services. A DMEPOS supplier must notify their accreditation organization when a new DMEPOS location is opened. The accreditation organization may accredit the new supplier location for three months after it is operational without requiring a new site visit. All DMEPOS supplier locations, whether owned or subcontracted, must meet the [employer] and be separately accredited in order to bill Medicare. An accredited supplier may be denied enrollment or their enrollment may be revoked, if CMS determines that they are not in compliance with the DMEPOS quality standards. A DMEPOS supplier must disclose upon enrollment all products and services, including the addition of new product lines for which they are seeking accreditation. If a new product line is added after enrollment, the DMEPOS supplier will be responsible for notifying the accrediting body of the new product so that the DMEPOS supplier can be re-surveyed and accredited for these new products. Must meet the surety bond requirements specified in 42 C. F.R. 424.57(c). Implementation date- May 4, 2009. A supplier must obtain oxygen from a state-licensed oxygen supplier. A supplier must maintain ordering and referring documentation consistent with provisions found in 42 C. F.R. 424.516(f). DMEPOS suppliers are prohibited from sharing a practice location with certain other Medicare providers and suppliers. DMEPOS suppliers must remain open to the public for a minimum of 30 hours per week with certain exceptions.

## 2022-12-13 NOTE — PROGRESS NOTES
Name: Sukhwinder Corbin  YOB: 1960  Gender: male  MRN: [id number]              HPI: Sukhwinder Corbin is a 65 y.o. male seen for left knee problems. ROS/Meds/PSH/PMH/FH/SH: A ten system review of systems was performed and is negative other than what is in the HPI. Tobacco:  reports that he has never smoked. He has never used smokeless tobacco.  Ht 5' 11\" (1.803 m)   BMI 29.85 kg/m²      Physical Examination:  He is an awake alert gentleman ambulating with an antalgic gait on the left side    His right knee is well-healed incisions  The right knee has a range of motion of 0 to 135 degrees  negative Lachman,  negative anterior drawer,   negative posterior drawer  negative pivot. Good tibial step-off,   No varus or valgus laxity at 0 or 30 degrees. Negative lateral joint line tenderness   negative lateral Vik. Negative medial joint line tenderness  negative medial Vik. No evidence of any posterolateral instability. No patellofemoral pain. Negative compression,   negative apprehension  no effusion. Calves Are non-tender,  neurovascularly patient is intact. Negative Homans. MPFL is non-tender. Patient Can fully extend the knee. Good quad tone  No erythema. Negative Dial test.    The left knee has a range of motion of 0 to 135 degrees  2+ Lachman,  2+ anterior drawer,   negative posterior drawer  Trace pivot. Good tibial step-off,   No varus or valgus laxity at 0 or 30 degrees. Negative lateral joint line tenderness   negative lateral Vik. Positive medial joint line tenderness  Positive medial Vik. No evidence of any posterolateral instability. No patellofemoral pain. Negative compression,   negative apprehension  Trace effusion. Calves Are non-tender,  neurovascularly patient is intact. Negative Homans. MPFL is non-tender. Patient Can fully extend the knee. Good quad tone  No erythema.    Negative Dial test.            Data Reviewed:    MRI left knee demonstrates ACL to be torn PCL is intact. Medial and lateral menisci are intact. There is a grade 2 sprain of the MCL. Articular cartilage appears intact. Impression:   1. Rupture of anterior cruciate ligament of left knee, subsequent encounter       Rule out internal derangement left knee  Low back sprain  Status post arthroscopy of the right knee ACL reconstruction utilizing allograft bone patella tendon bone graft, MCL repair, partial lateral meniscectomy, chondroplasty of the medial femoral condyle 3.5 years by Dr. Miner Section  BMI of over 31    Plan:   I discussed the problem with the patient. I discussed nonoperative versus operative intervention. In my opinion he has exhausted nonoperative modalities. He would be a candidate for an arthroscopy of the left knee ACL reconstruction utilizing allograft bone patella tendon bone graft. At the time of surgery I would address any intra-articular left knee pathology. This will be performed utilizing general anesthesia with a femoral sciatic nerve block on outpatient basis. I discussed the risks and benefits of the procedure with him and expected outcomes and we will proceed at his convenience  4 This is an acute complicated injury    Follow up: No follow-ups on file.        Yolanda Bae MD

## 2022-12-14 PROBLEM — S83.512A LEFT ANTERIOR CRUCIATE LIGAMENT TEAR: Status: ACTIVE | Noted: 2022-12-14

## 2022-12-14 NOTE — H&P
Subjective:     Patient is a 65 y.o. MALE WITH LEFT KNEE PAIN. SEE OFFICE NOTE. Patient Active Problem List    Diagnosis Date Noted    Left anterior cruciate ligament tear 12/14/2022    IFG (impaired fasting glucose) 05/13/2021    Obesity (BMI 30-39.9) 03/03/2020    Recurrent sinus infections 01/23/2020    White coat syndrome without diagnosis of hypertension 01/23/2020    MOHIT (obstructive sleep apnea) 01/22/2020    Allergic rhinitis 01/22/2020    Mixed hyperlipidemia 12/05/2019     Past Medical History:   Diagnosis Date    Allergic rhinitis     Chronic sphenoidal sinusitis     IFG (impaired fasting glucose) 5/13/2021    IFG (impaired fasting glucose) 5/13/2021    Mixed hyperlipidemia 12/5/2019    MOHIT (obstructive sleep apnea) 1/22/2020      Past Surgical History:   Procedure Laterality Date    HEENT Left 11/12/2020    L sided [facility]    ORTHOPEDIC SURGERY Right     knee reconstruction      Prior to Admission medications    Medication Sig Start Date End Date Taking? Authorizing Provider   cyanocobalamin 1000 MCG tablet Take 1,000 mcg by mouth daily    Ar Automatic Reconciliation   fluticasone (FLONASE) 50 MCG/ACT nasal spray 2 sprays by Nasal route daily 3/19/20   Ar Automatic Reconciliation   levocetirizine (XYZAL) 5 MG tablet Take 5 mg by mouth daily    Ar Automatic Reconciliation     Allergies   Allergen Reactions    Penicillins Other (See Comments)     Headaches      Social History     Tobacco Use    Smoking status: Never    Smokeless tobacco: Never   Substance Use Topics    Alcohol use: Yes      Family History   Problem Relation Age of Onset    Heart Disease Father     Cancer Mother         kidney      Review of Systems  Pertinent items are noted in HPI. Objective:     No data found. There were no vitals taken for this visit.     General Appearance:    Alert, cooperative, no distress, appears stated age   Head:    Normocephalic, without obvious abnormality, atraumatic Back:     Symmetric, no curvature, ROM normal, no CVA tenderness   Lungs:     Clear to auscultation bilaterally, respirations unlabored   Chest wall:    No tenderness or deformity   Heart:    Regular rate and rhythm, S1 and S2 normal, no murmur, rub   or gallop               Extremities:   Extremities normal, atraumatic, no cyanosis or edema   Pulses:   2+ and symmetric all extremities   Skin:   Skin color, texture, turgor normal, no rashes or lesions   Lymph nodes:   Cervical, supraclavicular, and axillary nodes normal   Neurologic:   CNII-XII intact. Normal strength, sensation and reflexes       throughout         Assessment:     Principal Problem:    Left anterior cruciate ligament tear  Resolved Problems:    * No resolved hospital problems. *      Plan:     The various methods of treatment have been discussed with the patient and family. PATIENT HAS EXHAUSTED NON-OPERATIVE MODALITIES     After consideration of risks, benefits and other options for treatment, the patient has consented to surgical intervention.     SEE OFFICE NOTE    Murray Terrazas MD

## 2022-12-20 NOTE — PERIOP NOTE
Patient verified name and . Order for consent not found in EHR. Type 1B surgery, PAT phone assessment complete. Orders not received. Labs per surgeon: no orders received. Labs per anesthesia protocol: none. Patient answered medical/surgical history questions at their best of ability. All prior to admission medications documented in [facility]. Patient instructed to take the following medications the day of surgery according to anesthesia guidelines with a small sip of water: none. On the day before surgery please take Acetaminophen 1000mg in the morning and then again before bed. You may substitute for Tylenol 650 mg. Hold all vitamins 7 days prior to surgery and NSAIDS 5 days prior to surgery. Patient instructed on the following:    > Arrive at A Entrance, time of arrival to be called the day before by 1700  > NPO after midnight, unless otherwise indicated, including gum, mints, and ice chips  > Responsible adult must drive patient to the hospital, stay during surgery, and patient will need supervision 24 hours after anesthesia  > Use antibacterial soap in shower the night before surgery and on the morning of surgery  > All piercings must be removed prior to arrival.    > Leave all valuables (money and jewelry) at home but bring insurance card and ID on DOS.   > You may be required to pay a deductible or co-pay on the day of your procedure. You can pre-pay by calling [phone] if your surgery is at the [facility] or [phone] if your surgery is at the [facility]. > Do not wear make-up, nail polish, lotions, cologne, perfumes, powders, or oil on skin. Artificial nails are not permitted.

## 2022-12-22 ENCOUNTER — ANESTHESIA EVENT (OUTPATIENT)
Dept: SURGERY | Age: 62
End: 2022-12-22
Payer: COMMERCIAL

## 2022-12-22 ENCOUNTER — PREP FOR PROCEDURE (OUTPATIENT)
Dept: ORTHOPEDIC SURGERY | Age: 62
End: 2022-12-22

## 2022-12-22 RX ORDER — SODIUM CHLORIDE 0.9 % (FLUSH) 0.9 %
5-40 SYRINGE (ML) INJECTION PRN
Status: CANCELLED | OUTPATIENT
Start: 2022-12-22

## 2022-12-22 RX ORDER — SODIUM CHLORIDE 9 MG/ML
INJECTION, SOLUTION INTRAVENOUS PRN
Status: CANCELLED | OUTPATIENT
Start: 2022-12-22

## 2022-12-22 RX ORDER — SODIUM CHLORIDE 0.9 % (FLUSH) 0.9 %
5-40 SYRINGE (ML) INJECTION EVERY 12 HOURS SCHEDULED
Status: CANCELLED | OUTPATIENT
Start: 2022-12-22

## 2022-12-23 ENCOUNTER — APPOINTMENT (OUTPATIENT)
Dept: GENERAL RADIOLOGY | Age: 62
End: 2022-12-23
Attending: ORTHOPAEDIC SURGERY
Payer: COMMERCIAL

## 2022-12-23 ENCOUNTER — ANESTHESIA (OUTPATIENT)
Dept: SURGERY | Age: 62
End: 2022-12-23
Payer: COMMERCIAL

## 2022-12-23 ENCOUNTER — HOSPITAL ENCOUNTER (OUTPATIENT)
Age: 62
Setting detail: OUTPATIENT SURGERY
Discharge: HOME | End: 2022-12-23
Attending: ORTHOPAEDIC SURGERY | Admitting: ORTHOPAEDIC SURGERY
Payer: COMMERCIAL

## 2022-12-23 VITALS
WEIGHT: 208.11 LBS | SYSTOLIC BLOOD PRESSURE: 137 MMHG | TEMPERATURE: 97.8 F | OXYGEN SATURATION: 98 % | BODY MASS INDEX: 29.14 KG/M2 | HEART RATE: 81 BPM | RESPIRATION RATE: 16 BRPM | DIASTOLIC BLOOD PRESSURE: 80 MMHG | HEIGHT: 71 IN

## 2022-12-23 DIAGNOSIS — S83.512D RUPTURE OF ANTERIOR CRUCIATE LIGAMENT OF LEFT KNEE, SUBSEQUENT ENCOUNTER: Primary | ICD-10-CM

## 2022-12-23 PROBLEM — S83.282A TEAR OF LATERAL MENISCUS OF LEFT KNEE: Status: ACTIVE | Noted: 2022-12-23

## 2022-12-23 PROBLEM — M22.42 CHONDROMALACIA OF LEFT PATELLA: Status: ACTIVE | Noted: 2022-12-23

## 2022-12-23 PROBLEM — M94.262 CHONDROMALACIA OF LEFT KNEE: Status: ACTIVE | Noted: 2022-12-23

## 2022-12-23 PROCEDURE — 76942 ECHO GUIDE FOR BIOPSY: CPT | Performed by: ANESTHESIOLOGY

## 2022-12-23 PROCEDURE — 6360000002 HC RX W HCPCS: Performed by: ANESTHESIOLOGY

## 2022-12-23 PROCEDURE — C1769 GUIDE WIRE: HCPCS | Performed by: ORTHOPAEDIC SURGERY

## 2022-12-23 PROCEDURE — 2580000003 HC RX 258: Performed by: ANESTHESIOLOGY

## 2022-12-23 PROCEDURE — 6360000002 HC RX W HCPCS: Performed by: NURSE ANESTHETIST, CERTIFIED REGISTERED

## 2022-12-23 PROCEDURE — 3600000004 HC SURGERY LEVEL 4 BASE: Performed by: ORTHOPAEDIC SURGERY

## 2022-12-23 PROCEDURE — 2720000010 HC SURG SUPPLY STERILE: Performed by: ORTHOPAEDIC SURGERY

## 2022-12-23 PROCEDURE — 7100000010 HC PHASE II RECOVERY - FIRST 15 MIN: Performed by: ORTHOPAEDIC SURGERY

## 2022-12-23 PROCEDURE — 3700000001 HC ADD 15 MINUTES (ANESTHESIA): Performed by: ORTHOPAEDIC SURGERY

## 2022-12-23 PROCEDURE — 73560 X-RAY EXAM OF KNEE 1 OR 2: CPT

## 2022-12-23 PROCEDURE — 7100000001 HC PACU RECOVERY - ADDTL 15 MIN: Performed by: ORTHOPAEDIC SURGERY

## 2022-12-23 PROCEDURE — 2709999900 HC NON-CHARGEABLE SUPPLY: Performed by: ORTHOPAEDIC SURGERY

## 2022-12-23 PROCEDURE — 2500000003 HC RX 250 WO HCPCS: Performed by: NURSE ANESTHETIST, CERTIFIED REGISTERED

## 2022-12-23 PROCEDURE — 29888 ARTHRS AID ACL RPR/AGMNTJ: CPT | Performed by: ORTHOPAEDIC SURGERY

## 2022-12-23 PROCEDURE — 6370000000 HC RX 637 (ALT 250 FOR IP): Performed by: ANESTHESIOLOGY

## 2022-12-23 PROCEDURE — 2500000003 HC RX 250 WO HCPCS: Performed by: ANESTHESIOLOGY

## 2022-12-23 PROCEDURE — 29881 ARTHRS KNE SRG MNISECTMY M/L: CPT | Performed by: ORTHOPAEDIC SURGERY

## 2022-12-23 PROCEDURE — 3600000014 HC SURGERY LEVEL 4 ADDTL 15MIN: Performed by: ORTHOPAEDIC SURGERY

## 2022-12-23 PROCEDURE — L1830 KO IMMOB CANVAS LONG PRE OTS: HCPCS | Performed by: ORTHOPAEDIC SURGERY

## 2022-12-23 PROCEDURE — 6360000002 HC RX W HCPCS: Performed by: ORTHOPAEDIC SURGERY

## 2022-12-23 PROCEDURE — 64447 NJX AA&/STRD FEMORAL NRV IMG: CPT | Performed by: ANESTHESIOLOGY

## 2022-12-23 PROCEDURE — C1762 CONN TISS, HUMAN(INC FASCIA): HCPCS | Performed by: ORTHOPAEDIC SURGERY

## 2022-12-23 PROCEDURE — 7100000000 HC PACU RECOVERY - FIRST 15 MIN: Performed by: ORTHOPAEDIC SURGERY

## 2022-12-23 PROCEDURE — 3700000000 HC ANESTHESIA ATTENDED CARE: Performed by: ORTHOPAEDIC SURGERY

## 2022-12-23 PROCEDURE — C1713 ANCHOR/SCREW BN/BN,TIS/BN: HCPCS | Performed by: ORTHOPAEDIC SURGERY

## 2022-12-23 DEVICE — IMPLANTABLE DEVICE: Type: IMPLANTABLE DEVICE | Site: KNEE | Status: FUNCTIONAL

## 2022-12-23 DEVICE — BIOSURE REGENSORB INTERFERENCE                                    SCREW 8 MM X 25MM
Type: IMPLANTABLE DEVICE | Site: KNEE | Status: FUNCTIONAL
Brand: BIOSURE

## 2022-12-23 DEVICE — WASHER ORTH L5.5MM TI POST SCR: Type: IMPLANTABLE DEVICE | Site: KNEE | Status: FUNCTIONAL

## 2022-12-23 RX ORDER — HYDROMORPHONE HYDROCHLORIDE 2 MG/1
2 TABLET ORAL EVERY 6 HOURS PRN
Qty: 40 TABLET | Refills: 0 | Status: SHIPPED | OUTPATIENT
Start: 2022-12-23 | End: 2023-01-02

## 2022-12-23 RX ORDER — HYDROMORPHONE HYDROCHLORIDE 1 MG/ML
0.5 INJECTION, SOLUTION INTRAMUSCULAR; INTRAVENOUS; SUBCUTANEOUS EVERY 5 MIN PRN
Status: DISCONTINUED | OUTPATIENT
Start: 2022-12-23 | End: 2022-12-23 | Stop reason: HOSPADM

## 2022-12-23 RX ORDER — SODIUM CHLORIDE 0.9 % (FLUSH) 0.9 %
5-40 SYRINGE (ML) INJECTION PRN
Status: DISCONTINUED | OUTPATIENT
Start: 2022-12-23 | End: 2022-12-23 | Stop reason: HOSPADM

## 2022-12-23 RX ORDER — SODIUM CHLORIDE 9 MG/ML
INJECTION, SOLUTION INTRAVENOUS PRN
Status: DISCONTINUED | OUTPATIENT
Start: 2022-12-23 | End: 2022-12-23

## 2022-12-23 RX ORDER — PROPOFOL 10 MG/ML
INJECTION, EMULSION INTRAVENOUS PRN
Status: DISCONTINUED | OUTPATIENT
Start: 2022-12-23 | End: 2022-12-23 | Stop reason: SDUPTHER

## 2022-12-23 RX ORDER — SODIUM CHLORIDE 0.9 % (FLUSH) 0.9 %
5-40 SYRINGE (ML) INJECTION EVERY 12 HOURS SCHEDULED
Status: DISCONTINUED | OUTPATIENT
Start: 2022-12-23 | End: 2022-12-23 | Stop reason: HOSPADM

## 2022-12-23 RX ORDER — PROMETHAZINE HYDROCHLORIDE 25 MG/1
25 TABLET ORAL EVERY 6 HOURS PRN
Qty: 20 TABLET | Refills: 0 | Status: SHIPPED | OUTPATIENT
Start: 2022-12-23 | End: 2022-12-28

## 2022-12-23 RX ORDER — KETOROLAC TROMETHAMINE 10 MG/1
TABLET, FILM COATED ORAL
Qty: 20 TABLET | Refills: 0 | Status: SHIPPED | OUTPATIENT
Start: 2022-12-23

## 2022-12-23 RX ORDER — HALOPERIDOL 5 MG/ML
1 INJECTION INTRAMUSCULAR
Status: DISCONTINUED | OUTPATIENT
Start: 2022-12-23 | End: 2022-12-23 | Stop reason: HOSPADM

## 2022-12-23 RX ORDER — SODIUM CHLORIDE 9 MG/ML
INJECTION, SOLUTION INTRAVENOUS PRN
Status: DISCONTINUED | OUTPATIENT
Start: 2022-12-23 | End: 2022-12-23 | Stop reason: HOSPADM

## 2022-12-23 RX ORDER — GABAPENTIN 100 MG/1
100 CAPSULE ORAL 3 TIMES DAILY
Qty: 90 CAPSULE | Refills: 0 | Status: SHIPPED | OUTPATIENT
Start: 2022-12-23 | End: 2023-01-22

## 2022-12-23 RX ORDER — KETOROLAC TROMETHAMINE 30 MG/ML
INJECTION, SOLUTION INTRAMUSCULAR; INTRAVENOUS PRN
Status: DISCONTINUED | OUTPATIENT
Start: 2022-12-23 | End: 2022-12-23 | Stop reason: SDUPTHER

## 2022-12-23 RX ORDER — SODIUM CHLORIDE, SODIUM LACTATE, POTASSIUM CHLORIDE, CALCIUM CHLORIDE 600; 310; 30; 20 MG/100ML; MG/100ML; MG/100ML; MG/100ML
INJECTION, SOLUTION INTRAVENOUS CONTINUOUS
Status: DISCONTINUED | OUTPATIENT
Start: 2022-12-23 | End: 2022-12-23 | Stop reason: HOSPADM

## 2022-12-23 RX ORDER — FENTANYL CITRATE 50 UG/ML
INJECTION, SOLUTION INTRAMUSCULAR; INTRAVENOUS PRN
Status: DISCONTINUED | OUTPATIENT
Start: 2022-12-23 | End: 2022-12-23 | Stop reason: SDUPTHER

## 2022-12-23 RX ORDER — ONDANSETRON 2 MG/ML
INJECTION INTRAMUSCULAR; INTRAVENOUS PRN
Status: DISCONTINUED | OUTPATIENT
Start: 2022-12-23 | End: 2022-12-23 | Stop reason: SDUPTHER

## 2022-12-23 RX ORDER — LIDOCAINE HYDROCHLORIDE 10 MG/ML
1 INJECTION, SOLUTION INFILTRATION; PERINEURAL
Status: DISCONTINUED | OUTPATIENT
Start: 2022-12-23 | End: 2022-12-23 | Stop reason: HOSPADM

## 2022-12-23 RX ORDER — FENTANYL CITRATE 50 UG/ML
100 INJECTION, SOLUTION INTRAMUSCULAR; INTRAVENOUS
Status: COMPLETED | OUTPATIENT
Start: 2022-12-23 | End: 2022-12-23

## 2022-12-23 RX ORDER — LIDOCAINE HYDROCHLORIDE 20 MG/ML
INJECTION, SOLUTION EPIDURAL; INFILTRATION; INTRACAUDAL; PERINEURAL PRN
Status: DISCONTINUED | OUTPATIENT
Start: 2022-12-23 | End: 2022-12-23 | Stop reason: SDUPTHER

## 2022-12-23 RX ORDER — DEXAMETHASONE SODIUM PHOSPHATE 10 MG/ML
INJECTION INTRAMUSCULAR; INTRAVENOUS PRN
Status: DISCONTINUED | OUTPATIENT
Start: 2022-12-23 | End: 2022-12-23 | Stop reason: SDUPTHER

## 2022-12-23 RX ORDER — IPRATROPIUM BROMIDE AND ALBUTEROL SULFATE 2.5; .5 MG/3ML; MG/3ML
1 SOLUTION RESPIRATORY (INHALATION)
Status: DISCONTINUED | OUTPATIENT
Start: 2022-12-23 | End: 2022-12-23 | Stop reason: HOSPADM

## 2022-12-23 RX ORDER — ACETAMINOPHEN 500 MG
1000 TABLET ORAL ONCE
Status: COMPLETED | OUTPATIENT
Start: 2022-12-23 | End: 2022-12-23

## 2022-12-23 RX ORDER — PROCHLORPERAZINE EDISYLATE 5 MG/ML
5 INJECTION INTRAMUSCULAR; INTRAVENOUS
Status: DISCONTINUED | OUTPATIENT
Start: 2022-12-23 | End: 2022-12-23 | Stop reason: HOSPADM

## 2022-12-23 RX ORDER — MIDAZOLAM HYDROCHLORIDE 2 MG/2ML
2 INJECTION, SOLUTION INTRAMUSCULAR; INTRAVENOUS
Status: COMPLETED | OUTPATIENT
Start: 2022-12-23 | End: 2022-12-23

## 2022-12-23 RX ADMIN — FENTANYL CITRATE 25 MCG: 50 INJECTION, SOLUTION INTRAMUSCULAR; INTRAVENOUS at 12:52

## 2022-12-23 RX ADMIN — FENTANYL CITRATE 25 MCG: 50 INJECTION, SOLUTION INTRAMUSCULAR; INTRAVENOUS at 12:47

## 2022-12-23 RX ADMIN — ROPIVACAINE HYDROCHLORIDE 30 ML: 5 INJECTION, SOLUTION EPIDURAL; INFILTRATION; PERINEURAL at 10:59

## 2022-12-23 RX ADMIN — FENTANYL CITRATE 25 MCG: 50 INJECTION, SOLUTION INTRAMUSCULAR; INTRAVENOUS at 12:17

## 2022-12-23 RX ADMIN — DEXAMETHASONE SODIUM PHOSPHATE 8 MG: 10 INJECTION INTRAMUSCULAR; INTRAVENOUS at 12:01

## 2022-12-23 RX ADMIN — LIDOCAINE HYDROCHLORIDE 60 MG: 20 INJECTION, SOLUTION EPIDURAL; INFILTRATION; INTRACAUDAL; PERINEURAL at 11:53

## 2022-12-23 RX ADMIN — Medication 2000 MG: at 12:01

## 2022-12-23 RX ADMIN — ONDANSETRON 4 MG: 2 INJECTION INTRAMUSCULAR; INTRAVENOUS at 12:01

## 2022-12-23 RX ADMIN — FENTANYL CITRATE 100 MCG: 50 INJECTION, SOLUTION INTRAMUSCULAR; INTRAVENOUS at 11:00

## 2022-12-23 RX ADMIN — KETOROLAC TROMETHAMINE 30 MG: 30 INJECTION, SOLUTION INTRAMUSCULAR; INTRAVENOUS at 13:12

## 2022-12-23 RX ADMIN — SODIUM CHLORIDE, SODIUM LACTATE, POTASSIUM CHLORIDE, AND CALCIUM CHLORIDE: 600; 310; 30; 20 INJECTION, SOLUTION INTRAVENOUS at 09:35

## 2022-12-23 RX ADMIN — ACETAMINOPHEN 1000 MG: 500 TABLET, FILM COATED ORAL at 09:25

## 2022-12-23 RX ADMIN — SODIUM CHLORIDE, SODIUM LACTATE, POTASSIUM CHLORIDE, AND CALCIUM CHLORIDE: 600; 310; 30; 20 INJECTION, SOLUTION INTRAVENOUS at 11:39

## 2022-12-23 RX ADMIN — MIDAZOLAM 2 MG: 1 INJECTION INTRAMUSCULAR; INTRAVENOUS at 11:00

## 2022-12-23 RX ADMIN — FENTANYL CITRATE 50 MCG: 50 INJECTION, SOLUTION INTRAMUSCULAR; INTRAVENOUS at 12:24

## 2022-12-23 RX ADMIN — FENTANYL CITRATE 25 MCG: 50 INJECTION, SOLUTION INTRAMUSCULAR; INTRAVENOUS at 12:13

## 2022-12-23 RX ADMIN — PHENYLEPHRINE HYDROCHLORIDE 100 MCG: 0.1 INJECTION, SOLUTION INTRAVENOUS at 11:58

## 2022-12-23 RX ADMIN — DEXAMETHASONE SODIUM PHOSPHATE 4 MG: 4 INJECTION, SOLUTION INTRAMUSCULAR; INTRAVENOUS at 10:59

## 2022-12-23 RX ADMIN — PROPOFOL 300 MG: 10 INJECTION, EMULSION INTRAVENOUS at 11:53

## 2022-12-23 RX ADMIN — ROPIVACAINE HYDROCHLORIDE 30 ML: 5 INJECTION, SOLUTION EPIDURAL; INFILTRATION; PERINEURAL at 11:08

## 2022-12-23 ASSESSMENT — PAIN SCALES - GENERAL
PAINLEVEL_OUTOF10: 0
PAINLEVEL_OUTOF10: 4
PAINLEVEL_OUTOF10: 0
PAINLEVEL_OUTOF10: 0

## 2022-12-23 ASSESSMENT — PAIN - FUNCTIONAL ASSESSMENT: PAIN_FUNCTIONAL_ASSESSMENT: 0-10

## 2022-12-23 NOTE — H&P
Update History & Physical    The patient's History and Physical of December 13, 2022 was reviewed with the patient and I examined the patient. There was no change. The surgical site was confirmed by the patient and me. Plan: The risks, benefits, expected outcome, and alternative to the recommended procedure have been discussed with the patient. Patient understands and wants to proceed with the procedure.      Electronically signed by Todd Miranda MD on 12/23/2022 at 9:57 AM

## 2022-12-23 NOTE — BRIEF OP NOTE
BRIEF OPERATIVE NOTE    Date of Procedure: 12/23/2022     Preoperative Diagnosis:  ACL TEAR KNEE LEFT KNEE    Postoperative Diagnosis:  SAME      LATERAL MENISCAL TEAR LEFT KNEE      CHONDROMALACIA LEFT KNEE      PATELLOFEMORAL CHONDROMALACIA LEFT KNEE    Procedure(s)  ARTHROSCOPY LEFT KNEE ANTERIOR CRUCIATE LIGAMENT RECONSTRUCTION UTILIZING ALLOGRAFT BONE PATELLA TENDON BONE GRAFT, PARTIAL LATERAL MENISECTOMY    Surgeon(s) and Role:     * Avani Gurrola MD - Primary         Assistant Staff:  NONE    Surgical Staff:  Circulator: Radhika Castillo RN  Surgical Assistant: Tere Castañeda  Scrub Person First: Beverly Plascencia  Scrub Person Second: Aisha Portillo      * Missing procedure start or end time(s) *     Anesthesia:  GENERAL WITH FEMORAL/SCIATIC NERVE BLOCK    Estimated Blood Loss: 20 CC. Complications: NONE    Implants:   Implant Name Type Inv.  Item Serial No.  Lot No. LRB No. Used Action   WASHER ORTH L5.5MM TI POST SCR - [id number]  WASHER ORTH L5.5MM TI POST [facility]  [employer] [id number] Left 1 Implanted   GRAFT HUM TISS PATELLAR TEND BNE 11 MM FRZN PRE-SHAPED - [id number]  GRAFT HUM TISS PATELLAR TEND BNE 11 MM FRZN PRE-SHAPED [id number] [employer] [id number] Left 1 Implanted   SCREW INTFR L25MM DIA8MM Matty Hagan [id number]  SCREW INTFR L25MM DIA8MM Jc Balbuena [employer] [facility] [id number] Left 1 Implanted       TOURNIQUET:  54 MINUTES    Janice Vargas MD

## 2022-12-23 NOTE — ANESTHESIA POSTPROCEDURE EVALUATION
Department of Anesthesiology  Postprocedure Note    Patient: Hermila Campbell  MRN: [id number]  YOB: 1960  Date of evaluation: 12/23/2022      Procedure Summary     Date: 12/23/22 [place] [facility] [place] [facility] [place]  / [facility] [place]    Anesthesia Start: 1146 Anesthesia Stop: 1160    Procedure: ARTHROSCOPY LEFT KNEE ANTERIOR CRUCIATE LIGAMENT RECONSTRUCTION UTILIZING ALLOGRAFT BONE PATELLA TENDON BONE GRAFT, PARTIAL LATERAL MENISECTOMY (Left: Knee) Diagnosis:       Rupture of anterior cruciate ligament of left knee, subsequent encounter      (Rupture of anterior cruciate ligament of left knee, subsequent encounter [S83.512D])    Surgeons: Madeleine Rai MD Responsible Provider: Michele Kussmaul, MD    Anesthesia Type: general ASA Status: 2          Anesthesia Type: No value filed.     Shellie Phase I: Shellie Score: 7    Shellie Phase II:        Anesthesia Post Evaluation    Patient location during evaluation: bedside  Patient participation: complete - patient participated  Level of consciousness: awake and alert  Pain score: 1  Airway patency: patent  Nausea & Vomiting: no vomiting  Complications: no  Cardiovascular status: hemodynamically stable  Respiratory status: acceptable  Hydration status: euvolemic

## 2022-12-23 NOTE — ANESTHESIA PROCEDURE NOTES
Peripheral Block    Patient location during procedure: pre-op  Reason for block: post-op pain management and at surgeon's request  Start time: 12/23/2022 10:59 AM  End time: 12/23/2022 11:03 AM  Staffing  Performed: anesthesiologist   Anesthesiologist: Morales Koehler MD  Preanesthetic Checklist  Completed: patient identified, IV checked, site marked, risks and benefits discussed, surgical/procedural consents, equipment checked, pre-op evaluation, timeout performed, anesthesia consent given, oxygen available and monitors applied/VS acknowledged  Peripheral Block   Patient position: supine  Prep: ChloraPrep  Provider prep: mask  Patient monitoring: cardiac monitor, continuous pulse ox, frequent blood pressure checks, IV access and oxygen  Block type: Femoral  Femoral crease  Laterality: left  Injection technique: single-shot  Guidance: nerve stimulator and ultrasound guided  Local infiltration: ropivacaine  Local infiltration: ropivacaine    Needle   Needle type: insulated echogenic nerve stimulator needle   Needle gauge: 21 G  Needle localization: ultrasound guidance and nerve stimulator  Needle length: 10 cm  Assessment   Injection assessment: negative aspiration for heme, no paresthesia on injection, local visualized surrounding nerve on ultrasound and no intravascular symptoms  Paresthesia pain: none  Slow fractionated injection: yes  Hemodynamics: stable  Real-time US image taken/store: yes  Outcomes: patient tolerated procedure well and uncomplicated    Additional Notes  -Block placed for post op pain at surgeon's request.     -Ultrasound used to identify anatomy of nerve bundle. -Needle placement and local injection at perineural area confirmed with real time ultrasound guidance.     -Local visualized with ultrasound surrounding nerve. -Permanent Image taken and placed on chart.       Medications Administered  dexamethasone 4 MG/ML - Perineural   4 mg - 12/23/2022 10:59:00 AM  EPINEPHrine PF 1 MG/ML Soln, ropivacaine 0.5% Soln (Mixture components: EPINEPHrine PF 1 MG/ML Soln, 0.005 mL; ropivacaine 0.5% Soln, 1 mL) - Perineural   30 mL - 12/23/2022 10:59:00 AM

## 2022-12-23 NOTE — ANESTHESIA PRE PROCEDURE
Department of Anesthesiology  Preprocedure Note       Name:  Lauren Rajput   Age:  65 y.o.  :  1960                                          MRN:  [id number]         Date:  2022      Surgeon: John De La Rosa):  Mariangel Monet MD    Procedure: Procedure(s):  left knee arthroscopy, anterior cruciate ligament reconstruction utilizing bone patella tendon bone allograft    Medications prior to admission:   Prior to Admission medications    Medication Sig Start Date End Date Taking? Authorizing Provider   HYDROmorphone (DILAUDID) 2 MG tablet Take 1 tablet by mouth every 6 hours as needed for Pain for up to 10 days. Max Daily Amount: 8 mg 22 Yes Mariangel Monet MD   gabapentin (NEURONTIN) 100 MG capsule Take 1 capsule by mouth 3 times daily for 30 days.  22 Yes Mariangel Monet MD   ketorolac (TORADOL) 10 MG tablet Take 1 tablet by mouth every 6 hours for 5 days post-op 22  Yes Mariangel Monet MD   promethazine (PHENERGAN) 25 MG tablet Take 1 tablet by mouth every 6 hours as needed for Nausea 22 Yes Mariangel Monet MD   cyanocobalamin 1000 MCG tablet Take 1,000 mcg by mouth daily    Ar Automatic Reconciliation   fluticasone (FLONASE) 50 MCG/ACT nasal spray 2 sprays by Nasal route daily as needed 3/19/20   Ar Automatic Reconciliation   levocetirizine (XYZAL) 5 MG tablet Take 5 mg by mouth nightly    Ar Automatic Reconciliation       Current medications:    Current Facility-Administered Medications   Medication Dose Route Frequency Provider Last Rate Last Admin    lidocaine 1 % injection 1 mL  1 mL IntraDERmal Once PRN Ulyess Reusing, MD        fentaNYL (SUBLIMAZE) injection 100 mcg  100 mcg IntraVENous Once PRN Ulyess Reusing, MD        sodium chloride flush 0.9 % injection 5-40 mL  5-40 mL IntraVENous 2 times per day Ulyess Reusing, MD        sodium chloride flush 0.9 % injection 5-40 mL  5-40 mL IntraVENous PRN Ulyess Reusing, MD        0.9 % sodium chloride infusion   IntraVENous PRN Ronda Choi MD        midazolam PF (VERSED) injection 2 mg  2 mg IntraVENous Once PRN Ronda Choi MD        ceFAZolin (ANCEF) 2000 mg in sterile water 20 mL IV syringe  2,000 mg IntraVENous On Call to Piedad Rolon MD        sodium chloride flush 0.9 % injection 5-40 mL  5-40 mL IntraVENous 2 times per day Avani Gurrola MD        sodium chloride flush 0.9 % injection 5-40 mL  5-40 mL IntraVENous PRN Avani Gurrola MD        lactated ringers infusion   IntraVENous Continuous Ronda Choi  mL/hr at 12/23/22 0935 New Bag at 12/23/22 0935       Allergies: Allergies   Allergen Reactions    Penicillins Other (See Comments)     Headaches       Problem List:    Patient Active Problem List   Diagnosis Code    MOHIT (obstructive sleep apnea) G47.33    IFG (impaired fasting glucose) R73.01    Obesity (BMI 30-39. 9) E66.9    Mixed hyperlipidemia E78.2    Recurrent sinus infections J32.9    White coat syndrome without diagnosis of hypertension R03.0    Allergic rhinitis J30.9    Left anterior cruciate ligament tear S83.512A       Past Medical History:        Diagnosis Date    Allergic rhinitis     Chronic sphenoidal sinusitis     IFG (impaired fasting glucose) 5/13/2021    IFG (impaired fasting glucose) 5/13/2021    Mixed hyperlipidemia 12/5/2019    MOHIT (obstructive sleep apnea) 1/22/2020    C-pap nightly    Postoperative hypotension     After surgery in 2018       Past Surgical History:        Procedure Laterality Date    HEENT Left 11/12/2020    L sided FESS- [facility] ORTHOPEDIC SURGERY Right 2018    knee reconstruction       Social History:    Social History     Tobacco Use    Smoking status: Never    Smokeless tobacco: Never   Substance Use Topics    Alcohol use: Not Currently                                Counseling given: Not Answered      Vital Signs (Current):   Vitals:    12/20/22 1015 12/23/22 0910   BP:  (!) 149/78   Pulse: 78   Resp:  16   Temp:  98.1 °F (36.7 °C)   SpO2:  98%   Weight: 205 lb (93 kg) 208 lb 1.8 oz (94.4 kg)   Height: 5' 11\" (1.803 m)                                               BP Readings from Last 3 Encounters:   12/23/22 (!) 149/78   05/05/21 (!) 160/92       NPO Status: Time of last liquid consumption: 2230                        Time of last solid consumption: 1930                        Date of last liquid consumption: 12/22/22                        Date of last solid food consumption: 12/22/22    BMI:   Wt Readings from Last 3 Encounters:   12/23/22 208 lb 1.8 oz (94.4 kg)   11/29/22 214 lb (97.1 kg)   09/28/21 215 lb (97.5 kg)     Body mass index is 29.03 kg/m². CBC:   Lab Results   Component Value Date/Time    WBC 6.2 05/05/2021 04:27 PM    RBC 5.15 05/05/2021 04:27 PM    HGB 15.8 05/05/2021 04:27 PM    HCT 45.8 05/05/2021 04:27 PM    MCV 89 05/05/2021 04:27 PM    RDW 13.1 05/05/2021 04:27 PM     05/05/2021 04:27 PM       CMP:   Lab Results   Component Value Date/Time     05/05/2021 04:27 PM    K 4.4 05/05/2021 04:27 PM     05/05/2021 04:27 PM    CO2 26 05/05/2021 04:27 PM    BUN 12 05/05/2021 04:27 PM    CREATININE 1.11 05/05/2021 04:27 PM    GFRAA 83 05/05/2021 04:27 PM    AGRATIO 1.7 05/05/2021 04:27 PM    GLUCOSE 131 05/05/2021 04:27 PM    PROT 6.7 05/05/2021 04:27 PM    CALCIUM 10.2 05/05/2021 04:27 PM    BILITOT 0.4 05/05/2021 04:27 PM    ALKPHOS 75 05/05/2021 04:27 PM    AST 13 05/05/2021 04:27 PM    ALT 13 05/05/2021 04:27 PM       POC Tests: No results for input(s): POCGLU, POCNA, POCK, POCCL, POCBUN, POCHEMO, POCHCT in the last 72 hours.     Coags: No results found for: PROTIME, INR, APTT    HCG (If Applicable): No results found for: PREGTESTUR, PREGSERUM, HCG, HCGQUANT     ABGs: No results found for: PHART, PO2ART, RJG5UNT, BLY5IHD, BEART, H6EKGQMW     Type & Screen (If Applicable):  No results found for: LABABO, LABRH    Drug/Infectious Status (If Applicable):  No results found for: HIV, HEPCAB    COVID-19 Screening (If Applicable): No results found for: COVID19        Anesthesia Evaluation  Patient summary reviewed and Nursing notes reviewed no history of anesthetic complications:   Airway: Mallampati: III       Mouth opening: > = 3 FB   Dental: normal exam         Pulmonary: breath sounds clear to auscultation  (+) sleep apnea: on CPAP,                             Cardiovascular:  Exercise tolerance: good (>4 METS),   (+) hypertension:, hyperlipidemia                  Neuro/Psych:   Negative Neuro/Psych ROS              GI/Hepatic/Renal: Neg GI/Hepatic/Renal ROS            Endo/Other: Negative Endo/Other ROS                    Abdominal:             Vascular: negative vascular ROS. Other Findings:           Anesthesia Plan      general     ASA 2     (Femoral and sciatic blocks for post-op pain)  Induction: intravenous. Anesthetic plan and risks discussed with patient, spouse and child/children.               Post-op pain plan if not by surgeon: single peripheral nerve block            Aura Aguilar MD   12/23/2022

## 2022-12-23 NOTE — DISCHARGE INSTRUCTIONS
INSTRUCTIONS FOLLOWING ARTHROSCOPY SURGERY  Dr. Ximena George [phone]    ACTIVITY   As tolerated and as directed by your doctor   Elevate surgery site first 48 hours. Use arm sling or crutches per your doctor's instructions. Bathe or shower as directed by your doctor. DIET   Clear liquids until no nausea or vomiting; then light diet for the first day   Advance to regular diet on second day, unless your doctor orders otherwise. If nausea and vomiting continues, call your doctor. PAIN   Take pain medication as directed by your doctor. Call your doctor if pain is NOT relieved by medication. DO NOT take aspirin or blood thinners until directed by your doctor. DRESSING CARE: Follow all dressing care instructions provided by Dr. Siria Collado from Dr Jacques Garcia office will call tomorrow with further instructions. If you have any problems or concerns, call your doctor as needed. CALL YOUR DOCTOR IF   Excessive bleeding that does not stop after holding mild pressure over the area   Temperature of 101°F or above   Redness, excessive swelling or bruising, and/or green or yellow, smelly discharge from incision    After general anesthesia or intravenous sedation, for 24 hours or while taking prescription Narcotics:  Limit your activities  A responsible adult needs to be with you for the next 24 hours  Do not drive and operate hazardous machinery  Do not make important personal or business decisions  Do not drink alcoholic beverages  If you have not urinated within 8 hours after discharge, and you are experiencing discomfort from urinary retention, please go to the nearest ED. If you have sleep apnea and have a CPAP machine, please use it for all naps and sleeping. Please use caution when taking narcotics and any of your home medications that may cause drowsiness. *  Please give a list of your current medications to your Primary Care Provider.   *  Please update this list whenever your

## 2022-12-23 NOTE — ANESTHESIA PROCEDURE NOTES
Peripheral Block    Patient location during procedure: pre-op  Reason for block: post-op pain management and at surgeon's request  Start time: 12/23/2022 11:04 AM  End time: 12/23/2022 11:08 AM  Staffing  Performed: anesthesiologist   Anesthesiologist: Leni Orlando MD  Preanesthetic Checklist  Completed: patient identified, IV checked, site marked, risks and benefits discussed, surgical/procedural consents, equipment checked, pre-op evaluation, timeout performed, anesthesia consent given, oxygen available and monitors applied/VS acknowledged  Peripheral Block   Patient position: right lateral decubitus  Prep: ChloraPrep  Provider prep: mask  Patient monitoring: cardiac monitor, continuous pulse ox, frequent blood pressure checks, IV access and oxygen  Block type: Sciatic  Subgluteal  Laterality: left  Injection technique: single-shot  Guidance: nerve stimulator and ultrasound guided    Needle   Needle type: insulated echogenic nerve stimulator needle   Needle gauge: 21 G  Needle localization: ultrasound guidance and nerve stimulator  Needle length: 10 cm  Assessment   Injection assessment: negative aspiration for heme, no paresthesia on injection, local visualized surrounding nerve on ultrasound and no intravascular symptoms  Paresthesia pain: none  Slow fractionated injection: yes  Hemodynamics: stable  Real-time US image taken/store: yes  Outcomes: uncomplicated and patient tolerated procedure well    Additional Notes  -Block placed for post op pain at surgeon's request.     -Ultrasound used to identify anatomy of nerve bundle. -Needle placement and local injection at perineural area confirmed with real time ultrasound guidance.     -Local visualized with ultrasound surrounding nerve. -Permanent Image taken and placed on chart.       Medications Administered  EPINEPHrine PF 1 MG/ML Soln, ropivacaine 0.5% Soln (Mixture components: EPINEPHrine PF 1 MG/ML Soln, 0.005 mL; ropivacaine 0.5% Soln, 1 mL) - Perineural   30 mL - 12/23/2022 11:08:00 AM  dexamethasone 4 MG/ML - Perineural   4 mg - 12/23/2022 11:08:00 AM

## 2022-12-24 NOTE — OP NOTE
[place]  OPERATIVE REPORT    Name:  Dong Yanez  MR#:  [id number]  :  1960  ACCOUNT #:  [de-identified]  DATE OF SERVICE:  2022    PREOPERATIVE DIAGNOSIS:  Anterior cruciate ligament tear, left knee. POSTOPERATIVE DIAGNOSES:  1. Anterior cruciate ligament tear, left knee. 2.  Lateral meniscal tear, left knee. 3.  Chondromalacia, left knee. 4.  Patellofemoral chondromalacia, left knee. PROCEDURES PERFORMED:  Arthroscopy of left knee, ACL reconstruction utilizing allograft bone-patellar tendon-bone graft, and partial lateral meniscectomy. SURGEON:  Davida Rendon. Obed Ivory MD        ANESTHESIA:  General with a femoral sciatic nerve block. COMPLICATIONS:  None. IMPLANTS:  Hardware utilized is one Med Surg post and screw, one 8 x 25 mm bioabsorbable interference screw. ESTIMATED BLOOD LOSS:  20 mL. FINDINGS:  1. ACL tear. 2.  Degenerative lateral meniscal tear. 3.  Chondromalacia with grade II-III changes of the trochlea and medial femoral condyle, grade I changes of the patella, lateral femoral condyle, and lateral tibial plateau. CPT CODES:  [id number] and [id number]. ICD-10 CODES:  S83.512, S83.282, M22.42, M94.262.    TOURNIQUET TIME:  55 minutes. INDICATIONS:  The patient is a 28-year-old gentleman who sustained a twisting left knee injury. Preoperative physical exam, radiographs, and an MRI demonstrate an ACL tear, left knee. The patient is now electively admitted for operative intervention. PROCEDURE:  Following identification of the patient, the patient was taken to the operative suite. Following administration of general anesthesia, femoral sciatic nerve block for postop pain control, 2 g of IV Ancef, the patient was positioned on the operative table in the supine fashion. Left knee was examined under anesthesia. The patient was noted to have 2+ Lachman, 2+ anterior drawer, and 1+ pivot through full range of motion.   Left leg was then prepped and draped in the sterile fashion. Left leg was then elevated and exsanguinated with an Esmarch bandage. Tourniquet was elevated to 300 mmHg. At this point, the skin incision over the anterior tibial tubercle was then marked. InteguSeal was applied. Once the InteguSeal was allowed to cure, the skin was incised. Subcutaneous tissue was then dissected down to the proximal medial tibial.  Soft tissue was elevated in a circumferential fashion for later drilling of our tibial tunnel. The two arthroscopic portal sites were then incised. Scope was introduced into the knee. Diagnostic arthroscopy was then commenced. Suprapatellar pouch, medial and lateral gutters were visualized. There was no evidence of loose body, foreign body, or pathologic plica. Undersurface of the patella and trochlear groove were visualized. There was some grade I chondromalacia of the patella and small grade II-III lesion of the trochlea which were left alone. Scope was advanced to the medial compartment. His leg was flexed to 30 degrees. Medial meniscus was visualized in its entirety as well as through the notch. There was no medial meniscal pathology. There was a grade II-II chondral lesion on the lateral border of the medial femoral condyle, and medial tibial plateau was intact. The medial femoral condyle was left alone. Scope was advanced to the notch. ACL was torn. PCL was intact. Scope was advanced to the lateral compartment. His leg was put in a figure-of-four position. Lateral meniscus was visualized in its entirety both above and below the popliteus hiatus. There was a degenerative lateral meniscal tear. With the use of upgoing flat biter and side biter, a partial lateral meniscectomy was then performed. This was contoured with a meniscal shaver. Meniscus was probed and noted to be stable. Articular surface was notable for grade I chondromalacia of the lateral femoral condyle and lateral tibial plateau.   At this point, scope was then advanced back into the notch. ACL stump and soft tissue were debrided using a shaver. A notchplasty was then performed using an acromionizing janes. The ACL tibial drill guide was inserted in the appropriate position on the tibial spine. The guidewire was then passed and noted to be in excellent position. The tibial tunnel was reamed with a 10.5 mm reamer. The tunnel was debrided of soft tissue using the shaver and acromionized using the janes. The femoral tunnel was then drilled via a freehand technique for the anteromedial portal, first with a 6 all the way up to a 10.5 mm tunnel. The tunnel was posterior with the posterior cortex intact. Beath pin was then passed out through the lateral cortex. A #5 suture was passed from the femoral tunnel down to the tibial tunnel. At this point, the allograft bone-patellar tendon-bone graft was then contoured to fit a 10.5 mm tunnel. The graft was passed up the tibial tunnel into the femoral tunnel. Femoral fixation was achieved utilizing an 8 x 25 mm bioabsorbable interference screw. This yielded an aperture fixation of our femoral bone plug. Anesthesia wake-up test was performed. Femoral fixation was stable. Tibial fixation was achieved using Med Surg post and screw. Graft was tensioned to 60 degrees with posterior drawer. Med Surg post and screw was assembled in the standard fashion. At this point, the knee was then examined. There was no further evidence of Lachman or pivot as the knee was put through range of motion. Scope was introduced back in the knee. Bone  plugs were completely sealed within their bony tunnels and the graft was in excellent position. At this point, the wound was then copiously irrigated. Arthroscopic equipment was removed from the knee. The portals were approximated using 2-0 nylon horizontal mattress sutures.   The anterior wound was closed with 0 Vicryl figure-of-eight sutures and 2-0 nylon horizontal mattress sutures. A sterile dressing was applied. Knee immobilizer was applied. Tourniquet was released. Tourniquet time was 55 minutes. The patient was then transferred to the recovery room in stable condition. Sanju Harden MD      [id number]/  D:  12/23/2022 14:01  T:  12/23/2022 21:43  JOB #:  [id number]  CC:   Sherwin Sanches MD

## 2022-12-26 ENCOUNTER — CLINICAL DOCUMENTATION (OUTPATIENT)
Dept: ORTHOPEDIC SURGERY | Age: 62
End: 2022-12-26

## 2022-12-27 ENCOUNTER — OFFICE VISIT (OUTPATIENT)
Dept: ORTHOPEDIC SURGERY | Age: 62
End: 2022-12-27

## 2022-12-27 DIAGNOSIS — Z48.89 ENCOUNTER FOR POSTOPERATIVE CARE: Primary | ICD-10-CM

## 2022-12-27 NOTE — PROGRESS NOTES
Today: 22    Name: Gilberto Spence  : 1960  MRN: [id number]    Patient comes in today for their post op appointment. He is 4 days status post Arthroscopy of left knee, ACL reconstruction utilizing allograft bone-patellar tendon-bone graft, and partial lateral meniscectomy. He comes in today doing great, no complaints. Patient states he has had no pain. Knee immobilizer and dressing was removed from the left knee. Incision is healing nicely and intact. Left knee was dressed with gauze and tegaderm. Patient will get started in outpatient physical therapy working on our ACL protocol. Patient was given post operative instructions and cautioned to be careful not to fall or any twisting motions on the knee. Patient will follow up in 1 week. Patient was only seen by Sonal Loredo CST to maintain post operative protocol, all orders for this visit were received verbally by Dr. Brady Castañeda prior to appointment.

## 2022-12-27 NOTE — PROGRESS NOTES
All orders are medically necessary for continuation of post operative care while patient is seen by Haile Srinivasan CST.

## 2023-01-03 ENCOUNTER — OFFICE VISIT (OUTPATIENT)
Dept: ORTHOPEDIC SURGERY | Age: 63
End: 2023-01-03

## 2023-01-03 DIAGNOSIS — Z48.89 ENCOUNTER FOR POSTOPERATIVE CARE: Primary | ICD-10-CM

## 2023-01-03 PROCEDURE — 99024 POSTOP FOLLOW-UP VISIT: CPT | Performed by: ORTHOPAEDIC SURGERY

## 2023-01-03 NOTE — PROGRESS NOTES
Name: Jorje Cuevas  YOB: 1960  Gender: male  MRN: [id number]              HPI: Jorje Cuevas is a 65 y.o. male 2 weeks status post arthroscopy of the left knee ACL reconstruction utilizing allograft bone patella tendon bone graft and partial lateral meniscectomy. Operative findings were notable for chondromalacia and patellofemoral chondromalacia left knee. He returns noting he is doing very well    ROS/Meds/PSH/PMH/FH/SH: A ten system review of systems was performed and is negative other than what is in the HPI. Tobacco:  reports that he has never smoked. He has never used smokeless tobacco.  There were no vitals taken for this visit. Physical Examination:  He is an awake alert gentleman ambulating with an antalgic gait on the left side    His right knee is well-healed incisions  The right knee has a range of motion of 0 to 135 degrees  negative Lachman,  negative anterior drawer,   negative posterior drawer  negative pivot. Good tibial step-off,   No varus or valgus laxity at 0 or 30 degrees. Negative lateral joint line tenderness   negative lateral Vik. Negative medial joint line tenderness  negative medial Vik. No evidence of any posterolateral instability. No patellofemoral pain. Negative compression,   negative apprehension  no effusion. Calves Are non-tender,  neurovascularly patient is intact. Negative Homans. MPFL is non-tender. Patient Can fully extend the knee. Good quad tone  No erythema. Negative Dial test.    The left knee incisions of healed  All sutures were removed  Range of motion left knee is from 0-1 25  No instability  Trace effusion  Minimal bruising  Calves are nontender  Neurovascular he is intact          Data Reviewed:             Impression:   1.  Encounter for postoperative care       status post arthroscopy of the left knee ACL reconstruction utilizing allograft bone patella tendon bone graft and partial lateral meniscectomy 2 weeks  Chondromalacia left knee  Patellofemoral chondromalacia left knee  Low back sprain  Status post arthroscopy of the right knee ACL reconstruction utilizing allograft bone patella tendon bone graft, MCL repair, partial lateral meniscectomy, chondroplasty of the medial femoral condyle 3.5 years by Dr. Unruly Irving  BMI of over 31    Plan:   I discussed the plan with the patient. We removed the sutures. We will fit him for a functional knee brace. He will continue physical therapy working on strength and motion. We discussed activity modification. I will recheck him back in 2 weeks      Follow up: Return in about 2 weeks (around 1/17/2023).              Cristal Monteiro MD

## 2023-01-10 ENCOUNTER — TELEPHONE (OUTPATIENT)
Dept: SLEEP MEDICINE | Age: 63
End: 2023-01-10

## 2023-01-10 NOTE — TELEPHONE ENCOUNTER
Explained to pt that he needs to be scheduled for an office visit in order to get a cpap prescription written. Please contact and schedule pt for an office visit.

## 2023-01-10 NOTE — TELEPHONE ENCOUNTER
Patient wants a new cpap prescription but refused to make an appt. Last visit was 3/2020.  Wants someone in the sleep dept to call him

## 2023-01-17 ENCOUNTER — OFFICE VISIT (OUTPATIENT)
Dept: ORTHOPEDIC SURGERY | Age: 63
End: 2023-01-17

## 2023-01-17 DIAGNOSIS — Z48.89 ENCOUNTER FOR POSTOPERATIVE CARE: Primary | ICD-10-CM

## 2023-01-17 PROCEDURE — 99024 POSTOP FOLLOW-UP VISIT: CPT | Performed by: ORTHOPAEDIC SURGERY

## 2023-01-17 NOTE — PROGRESS NOTES
Name: Negin Dewitt  YOB: 1960  Gender: male  MRN: [id number]              HPI: Negin Dewitt is a 65 y.o. male 4 weeks status post arthroscopy of the left knee ACL reconstruction utilizing allograft bone patella tendon bone graft and partial lateral meniscectomy. Operative findings were notable for chondromalacia and patellofemoral chondromalacia left knee. He returns noting he is doing very well. He did describe his left knee incision in the shower today    ROS/Meds/PSH/PMH/FH/SH: A ten system review of systems was performed and is negative other than what is in the HPI. Tobacco:  reports that he has never smoked. He has never used smokeless tobacco.  There were no vitals taken for this visit. Physical Examination:  He is an awake alert gentleman ambulating with an antalgic gait on the left side    His right knee is well-healed incisions  The right knee has a range of motion of 0 to 135 degrees  negative Lachman,  negative anterior drawer,   negative posterior drawer  negative pivot. Good tibial step-off,   No varus or valgus laxity at 0 or 30 degrees. Negative lateral joint line tenderness   negative lateral Vik. Negative medial joint line tenderness  negative medial Vik. No evidence of any posterolateral instability. No patellofemoral pain. Negative compression,   negative apprehension  no effusion. Calves Are non-tender,  neurovascularly patient is intact. Negative Homans. MPFL is non-tender. Patient Can fully extend the knee. Good quad tone  No erythema. Negative Dial test.    The left knee incisions of healed  Range of motion left knee is from 0-135  No instability  Trace effusion  Minimal bruising  Calves are nontender  Neurovascular he is intact  The inferior portion of the incision is slightly red and may represent a stitch abscess          Data Reviewed:             Impression:   1.  Encounter for postoperative care       status post arthroscopy of the left knee ACL reconstruction utilizing allograft bone patella tendon bone graft and partial lateral meniscectomy 4 weeks  Chondromalacia left knee  Patellofemoral chondromalacia left knee  Low back sprain  Status post arthroscopy of the right knee ACL reconstruction utilizing allograft bone patella tendon bone graft, MCL repair, partial lateral meniscectomy, chondroplasty of the medial femoral condyle 3.5 years by Dr. Ivette Box  BMI of over 31    Plan:   I discussed the plan with the patient. He will continue physical therapy working on strength and motion. I will reassess his progress back in 3 weeks      Follow up: Return in about 3 weeks (around 2/7/2023).              Sergio Domínguez MD General OB Triage Discharge Instructions

## 2023-01-24 NOTE — PROGRESS NOTES
[address] [place] [address] Court, [address] [facility]  [phone]    Patient Name:  Campos Hurd  YOB: 1960      Office Visit 1/26/2023    CHIEF COMPLAINT:      Chief Complaint   Patient presents with    Sleep Apnea    Follow-up    CPAP/BiPAP       HISTORY OF PRESENT ILLNESS:        The patient returns in initial follow-up for diagnosis of moderate obstructive sleep apnea becoming severe with REM sleep and associated with very severe arterial hypoxemia. The diagnostic polysomnography was notable for an apnea hypopnea index of 24.9/hr. REM AHI was 37.2/hr. Oxygen desaturations are low as 62% were noted with SpO2 less than 89% for a total of 40.4 minutes of the  test    He is currently on CPAP at 9.8 cm water with an EPR of 2, his AHI is running 1.2/hour down from a pretreatment level of about 25/hour. He has been sleeping about 7.5 hours per night and compliance over the past 30 days has been 100%. He is feeling better. He is having  to use a chinstrap to maintain his mouth closed throughout the night. He indicated that he has lost approximately 15 pounds over the last year or so. The patient also has had a significant improvement in his blood pressure. This is likely due to the elimination  of stress hormone release in the setting of severe hypoxemia with his sleep apnea. He is not having any lightheadedness or palpitations. He will need renewal for his mask and supplies and that will be done today. He also like to purchase a new machine on his own and that will be taking care of as well and he will be provided with appropriate prescription for that. His Horner score today was 5 out of 24.     Sleep Medicine 1/26/2023   Sitting and reading 0   Watching TV 0   Sitting, inactive in a public place (e.g. a theatre or a meeting) 0   As a passenger in a car for an hour without a break 1   Lying down to rest in the afternoon when circumstances permit 3 Sitting and talking to someone 0   Sitting quietly after a lunch without alcohol 1   In a car, while stopped for a few minutes in traffic 0   [place] Sleepiness Score 5             Past Medical History:   Diagnosis Date    Allergic rhinitis     Chronic sphenoidal sinusitis     IFG (impaired fasting glucose) 5/13/2021    IFG (impaired fasting glucose) 5/13/2021    Mixed hyperlipidemia 12/5/2019    MOHIT (obstructive sleep apnea) 1/22/2020    C-pap nightly    Postoperative hypotension     After surgery in 2018         Patient Active Problem List   Diagnosis    MOHIT (obstructive sleep apnea)    IFG (impaired fasting glucose)    Obesity (BMI 30-39. 9)    Mixed hyperlipidemia    Recurrent sinus infections    White coat syndrome without diagnosis of hypertension    Allergic rhinitis    Left anterior cruciate ligament tear    Tear of lateral meniscus of left knee    Chondromalacia of left patella    Chondromalacia of left knee    Nocturnal hypoxemia          Past Surgical History:   Procedure Laterality Date    HEENT Left 11/12/2020    L sided FESS- Daija Farner    KNEE SURGERY Left 12/23/2022    ARTHROSCOPY LEFT KNEE ANTERIOR CRUCIATE LIGAMENT RECONSTRUCTION UTILIZING ALLOGRAFT BONE PATELLA TENDON BONE GRAFT, PARTIAL LATERAL MENISECTOMY performed by Navin Rizo MD at [place] [id number] Right 2018    knee reconstruction       [unfilled]        Social History     Socioeconomic History    Marital status:      Spouse name: Not on file    Number of children: Not on file    Years of education: Not on file    Highest education level: Not on file   Occupational History    Not on file   Tobacco Use    Smoking status: Never    Smokeless tobacco: Never   Vaping Use    Vaping Use: Never used   Substance and Sexual Activity    Alcohol use: Not Currently    Drug use: No    Sexual activity: Not on file   Other Topics Concern    Not on file   Social History Narrative    Not on file     Social Determinants of Health     Financial Resource Strain: Not on file   Food Insecurity: Not on file   Transportation Needs: Not on file   Physical Activity: Not on file   Stress: Not on file   Social Connections: Not on file   Intimate Partner Violence: Not on file   Housing Stability: Not on file         Family History   Problem Relation Age of Onset    Heart Disease Father     Cancer Mother         kidney         Allergies   Allergen Reactions    Penicillins Other (See Comments)     Headaches         Current Outpatient Medications   Medication Sig    ketorolac (TORADOL) 10 MG tablet Take 1 tablet by mouth every 6 hours for 5 days post-op    cyanocobalamin 1000 MCG tablet Take 1,000 mcg by mouth daily    fluticasone (FLONASE) 50 MCG/ACT nasal spray 2 sprays by Nasal route daily as needed    levocetirizine (XYZAL) 5 MG tablet Take 5 mg by mouth nightly    gabapentin (NEURONTIN) 100 MG capsule Take 1 capsule by mouth 3 times daily for 30 days. No current facility-administered medications for this visit. REVIEW OF SYSTEMS:   CONSTITUTIONAL:   There is no history of fever, chills, night sweats, weight loss, weight gain, persistent fatigue, or lethargy/hypersomnolence. CARDIAC:   No chest pain, pressure, discomfort, palpitations, orthopnea, murmurs, or edema. GI:   No dysphagia, heartburn reflux, nausea/vomiting, diarrhea, abdominal pain, or bleeding. NEURO:   There is no history of AMS, persistent headache, decreased level of consciousness, seizures, or motor or sensory deficits. PHYSICAL EXAM:    Vitals:    01/26/23 1342   BP: (!) 146/82   Pulse: 96   Resp: 14   Temp: 96.9 °F (36.1 °C)   SpO2: 98%        GENERAL APPEARANCE:   The patient is normal weight and in no respiratory distress. HEENT:   PERRL. Conjunctivae unremarkable. Nasal mucosa is without epistaxis, exudate, or polyps. Gums and dentition are unremarkable. There is  oropharyngeal narrowing.        NECK/LYMPHATIC:   Symmetrical with no elevation of jugular venous pulsation. Trachea midline. No thyroid enlargement. No cervical adenopathy. LUNGS:   Normal respiratory effort with symmetrical lung expansion. Breath sounds are diminished in the bases. HEART:   There is a regular rate and rhythm. No murmur, rub, or gallop. There is no edema in the lower extremities. ABDOMEN:   Soft and non-tender. No hepatosplenomegaly. Bowel sounds are normal.     NEURO:   The patient is alert and oriented to person, place, and time. Memory appears intact and mood is normal.  No gross sensorimotor deficits are present. ASSESSMENT:  (Medical Decision Making)      Diagnosis Orders   1. MOHIT (obstructive sleep apnea), moderate and severe in rem sleep. Currently he is on CPAP at 9.8 cm and he we will have a new machine ordered with a setting of 10 cm. He will have his mask and supplies updated as well. DME - DURABLE MEDICAL EQUIPMENT      2. Nocturnal hypoxemia, this was severe and improved the CPAP treatment DME - DURABLE MEDICAL EQUIPMENT           PLAN:    Replacement machine with the setting of 10 cm pressure will be ordered with a EPR of 2    Continue proper sleep hygiene and positional therapy    Continue recommendation discussed previously regarding his weight management.   He was given information previously from [address] [place] [address] to help in this process    Maintain his follow-up with the primary care physician Dr. Mcwilliams Keep    Return to the sleep center in 1 year or sooner if needed    All questions and concerns were addressed to properly      Orders Placed This Encounter   Procedures    [facility]  [facility]  Phone: [phone] [employer] [address] [address]  Dept: [phone]      Patient Name: Daniel Kirk  : 1960  Gender: male  Address: [address] [place] [id number]  Patient phone number: [phone] (home)       Primary Insurance: Payor: Fabian Griffith / Plan: Fabian Griffith [place] / Product Type: *No Product type* /   Subscriber ID: [id number] - (Shelby ESPITIA)      AMB Supply Order  Order Details     DME Location:   Order Date: 1/26/2023   The primary encounter diagnosis was MOHIT (obstructive sleep apnea). A diagnosis of Nocturnal hypoxemia was also pertinent to this visit. (  X   )New Set-Up       machine   (  x   ) CPAP Unit  (     ) Auto CPAP Unit  (     ) BiLevel Unit  (     ) Auto BiLevel Unit  (     ) ASV         Length of need: 12 months    Pressure: 10  cmH20  EPR: 2  Ramp Time:    min    Starting Ramp Pressure:     cm H20    Patient had a diagnostic Apnea Hypopnea Index (AHI) of :  24.9/hr    *SUPPLIES* Replace all as needed, or per coverage guidelines     Masks Type:  P 30 I     (   ) -Full Face Mask (1 per 3 mon)  (    ) -Full Mask (1 per month) Interface/Cushion      (    ) -Nasal Mask (1 per 3 mon)  (    ) - Nasal Mask (1 per month) Interface/Cushion  (    ) -Pillow (2 per mon)  (    ) -Szxaybiic (1 per 6 mon)      _________________________________________________________________          Other Supplies:    (  X   )-Ybvjewqo (1 per 6 mon)  ( X    )-Btoufn Tubing (1 per 3 mon)  (  X   )- Disposable Filter (2 per mon)  (   X  )-Tvczdz Humidifier (1 per year)     (     )-Uctamniwf (sometimes used with Full Face Mask) (1 per 6 mos)  (     )-Tubing-without heat (1 per 3 mos)  (     )-Non-Disposable Filter (1 per 6 mos)  (     )-Water Chamber (1 per 6 mos)  (     )-Humidifier non-heated (1 per 5 yrs)      Signed Date: 1/26/2023  Electronically Signed By: Mattie Keyes MD        No orders of the defined types were placed in this encounter.        Over 50% of today's office visit was spent in face to face time reviewing test results, prognosis, importance of compliance, education about disease process, benefits of medications, instructions for management of acute flare-ups, and follow up plans. Total face to face time spent with patient was 35 minutes.         Davis Lindsey MD  Electronically signed

## 2023-01-26 ENCOUNTER — OFFICE VISIT (OUTPATIENT)
Dept: SLEEP MEDICINE | Age: 63
End: 2023-01-26
Payer: COMMERCIAL

## 2023-01-26 VITALS
SYSTOLIC BLOOD PRESSURE: 146 MMHG | WEIGHT: 209 LBS | TEMPERATURE: 96.9 F | DIASTOLIC BLOOD PRESSURE: 82 MMHG | HEART RATE: 96 BPM | HEIGHT: 71 IN | RESPIRATION RATE: 14 BRPM | BODY MASS INDEX: 29.26 KG/M2 | OXYGEN SATURATION: 98 %

## 2023-01-26 DIAGNOSIS — G47.33 OSA (OBSTRUCTIVE SLEEP APNEA): Primary | ICD-10-CM

## 2023-01-26 DIAGNOSIS — G47.34 NOCTURNAL HYPOXEMIA: ICD-10-CM

## 2023-01-26 PROCEDURE — 99214 OFFICE O/P EST MOD 30 MIN: CPT | Performed by: INTERNAL MEDICINE

## 2023-01-26 ASSESSMENT — SLEEP AND FATIGUE QUESTIONNAIRES
ESS TOTAL SCORE: 5
HOW LIKELY ARE YOU TO NOD OFF OR FALL ASLEEP WHILE SITTING INACTIVE IN A PUBLIC PLACE: 0
HOW LIKELY ARE YOU TO NOD OFF OR FALL ASLEEP WHILE LYING DOWN TO REST IN THE AFTERNOON WHEN CIRCUMSTANCES PERMIT: 3
HOW LIKELY ARE YOU TO NOD OFF OR FALL ASLEEP WHILE SITTING QUIETLY AFTER LUNCH WITHOUT ALCOHOL: 1
HOW LIKELY ARE YOU TO NOD OFF OR FALL ASLEEP WHILE SITTING AND TALKING TO SOMEONE: 0
HOW LIKELY ARE YOU TO NOD OFF OR FALL ASLEEP IN A CAR, WHILE STOPPED FOR A FEW MINUTES IN TRAFFIC: 0
HOW LIKELY ARE YOU TO NOD OFF OR FALL ASLEEP WHILE SITTING AND READING: 0
HOW LIKELY ARE YOU TO NOD OFF OR FALL ASLEEP WHILE WATCHING TV: 0
HOW LIKELY ARE YOU TO NOD OFF OR FALL ASLEEP WHEN YOU ARE A PASSENGER IN A CAR FOR AN HOUR WITHOUT A BREAK: 1

## 2023-01-26 NOTE — PATIENT INSTRUCTIONS
Sleep Hygiene Instructions    Sleep only as much as you need to feel refreshed during the following day. Restricting your time in bed helps to consolidate and deepen your sleep. Excessively long times in bed lead to fragmented and shallow sleep. Get up at your regular time the next day, no matter how little your slept. Get up at the same time each day, 7 days a week. A regular wake time in the morning leads to regular times on sleep onset, and helps to set your biological clock. Exercise regularly. Schedule exercise times so that they do not occur within 3 hours of when you intend to go to bed. Exercise makes it easier to initiate sleep and deepen sleep. Don't take your problems to bed. Plan some time earlier in the evening for working on your problems or planning the next day's activities. Worrying may interfere with initiating sleep and produce shallow sleep. Train yourself to use the bedroom only for sleep and sexual activity. This will help condition your brain to see bed as the place for sleeping. Do not read, watch TV or eat in bed. Do not try and fall asleep. This only makes the problem worse. Instead, turn on the light, leave the bedroom, and do something different like reading a book. Don't engage in stimulating activity. Return to bed only when you feel sleepy. Avoid long naps. Staying awake during the day helps to fall asleep at night. Naps totalling more than 30 minutes increase your chances of having trouble sleeping at night. Make sure that your bedroom is comfortable and free from light and noise. A comfortable, noise-free sleep environment will reduce the likelihood that you will wake up during the night. Noise that does not awaken you may disturb the quality of your sleep. Carpeting, insulated curtains, and closing the door may help. Make sure that your bedroom is at a comfortable temperature during the night.  Excessively warm or cold sleep environments may disturb sleep. Eat regular meals and di not go to bed hungry. Hunger may disturb sleep. A light snack at bedtime (especially carbohydrates) may help sleep, but avoid greasy or heavy foods. Avoid excessive liquids in the evening. Reducing liquid intake will minimize the need for night-time trips to the bathroom. Cut down on all caffeine products. Caffeinated beverages and food (Coffee, tea, cola, chocolate) can cause difficulty falling asleep, awakenings during the night, and shallow sleep. Even caffeine early in the day can disrupt night-time sleep. Avoid alcohol, especially in the evening. Although alcohol helps tense people fall asleep more easily, it causes awakenings later in the night. Smoking may disturb sleep. Nicotine is a stimulant. Try not to smoke during the night when you have trouble sleeping. Learning about Sleeping Well    What does sleeping well mean? Sleeping well means getting enough sleep. How much sleep is enough varies among people. The number of hours you sleep is not as improtant as how you feel when you wake up. If you do not feel refreshed, you probably need more sleep. Another sign of not getting enough sleep is feeling tired during the day. The average totally nightly sleep time is 7 1/2 to 8 hours. Healthy adults may need a little more or a little less than this. Why is getting enough sleep important? Getting enough quality sleep is a basic part of good health. When your sleep suffers, your mood and your thoughts can suffer too. Your thoughts can suffer too. You ma find yourself feeling more grumpy or stressed. No getting enough sleep also can lead to serious problems, including injury, accidents, anxiety, and depression. What might cause poor sleeping? Many things can cause sleep problems, including:    Stress. Stress can be caused by fear about a single event, such as giving a speech.   Or you may have ongoing stress, such as worry about work or school. Depression, anxiety, and other mental or emotional conditions. Changes in your sleep habits or surroundings. This includes changes that happen where you sleep, such as noise, light, or sleeping in a different bed. It also includes changes in your sleep pattern, such as having jet lab or working a late shift. Health problems, such as pain, breathing problems, and restless legs syndrome. Lack of regular exercise. How can you help yourself? Here are some tips that may help you sleep more soundly and wake up feeling more refreshed. Your sleeping area    Use your bedroom only for sleeping and sex. A bit of light reading may help you fall asleep. But if it doesn't, do your reading elsewhere in the house. Don't watch TV in bed. Be sure your bed is big enough to stretch out comfortable, especially if you have a sleep partner. Keep your bedroom quiet, dark, and cool. Use curtains, blinds, or sleep mask to block out light. To block out noise, use earplugs, soothing music, or a \"white noise\" machine. Your evening and bedtime routine    Create a relaxing bedtime routine. You might want to take a warm shower or bath, listen to soothing music, or drink a cup of non-caffeinated tea. Go to bed at the same time every night. And get up at the same time every morning, even if you feel tired. What to avoid:    Limit caffeine (coffee, tea, caffeinated sodas) during the day, and dont have any for at least 4 to 6 hours before bedtime. Don't drink alcohol before bedtime. Alcohol can cause you to wake up more often during the night. Don't smoke or use tobacco, especially in the evening. Nicotine can keep you awake. Don't like in bed away for too long. If you can't fall asleep, or if you wake up in the middle of the night and can't get back to sleep within 15 minutes or so, get out of bed and go to another room until you feel sleepy.     Don't take medicine right before bed that may keep you awake or make you feel hyper or enegerized. Your doctor can tell you if your medicine may do this and if you can take it earlier in the day. If you can't sleep:    Imagine yourself in a peaceful, pleasant scene. Focus on the details and feelings of being in a place that is relaxing. Get up and do a quiet or boring activity until you feel sleepy. Don't drink liquids after 6 p.m. if you wake up often because you have to go to the bathroom. Where can you learn more? Go to http://www.gray.com/    Enter X065 in the search box to learn more about \"Learning About Sleeping Well. \"

## 2023-02-08 ENCOUNTER — OFFICE VISIT (OUTPATIENT)
Dept: ORTHOPEDIC SURGERY | Age: 63
End: 2023-02-08

## 2023-02-08 DIAGNOSIS — Z48.89 ENCOUNTER FOR POSTOPERATIVE CARE: Primary | ICD-10-CM

## 2023-02-08 PROCEDURE — 99024 POSTOP FOLLOW-UP VISIT: CPT | Performed by: ORTHOPAEDIC SURGERY

## 2023-02-08 NOTE — PROGRESS NOTES
Name: Austen Martinez  YOB: 1960  Gender: male  MRN: [id number]              HPI: Austen Martinez is a 59 y.o. male 7 weeks status post arthroscopy of the left knee ACL reconstruction utilizing allograft bone patella tendon bone graft and partial lateral meniscectomy. Operative findings were notable for chondromalacia and patellofemoral chondromalacia left knee. He returns noting he is doing very well. ROS/Meds/PSH/PMH/FH/SH: A ten system review of systems was performed and is negative other than what is in the HPI. Tobacco:  reports that he has never smoked. He has never used smokeless tobacco.  There were no vitals taken for this visit. Physical Examination:  He is an awake alert gentleman ambulating with an antalgic gait on the left side    His right knee is well-healed incisions  The right knee has a range of motion of 0 to 135 degrees  negative Lachman,  negative anterior drawer,   negative posterior drawer  negative pivot. Good tibial step-off,   No varus or valgus laxity at 0 or 30 degrees. Negative lateral joint line tenderness   negative lateral Vik. Negative medial joint line tenderness  negative medial Vik. No evidence of any posterolateral instability. No patellofemoral pain. Negative compression,   negative apprehension  no effusion. Calves Are non-tender,  neurovascularly patient is intact. Negative Homans. MPFL is non-tender. Patient Can fully extend the knee. Good quad tone  No erythema. Negative Dial test.    The left knee incisions have healed  Quad weakness on the left which is not present on the right  The left knee has a range of motion of 0 to 135 degrees  negative Lachman,  negative anterior drawer,   negative posterior drawer  negative pivot. Good tibial step-off,   No varus or valgus laxity at 0 or 30 degrees. Negative lateral joint line tenderness   negative lateral Vik.    Negative medial joint line tenderness  negative medial Vik. No evidence of any posterolateral instability. No patellofemoral pain. Negative compression,   negative apprehension  no effusion. Calves Are non-tender,  neurovascularly patient is intact. Negative Homans. MPFL is non-tender. Patient Can fully extend the knee. Good quad tone  No erythema. Negative Dial test.              Data Reviewed:             Impression:   1. Encounter for postoperative care       status post arthroscopy of the left knee ACL reconstruction utilizing allograft bone patella tendon bone graft and partial lateral meniscectomy 7 weeks  Chondromalacia left knee  Patellofemoral chondromalacia left knee  Low back sprain  Status post arthroscopy of the right knee ACL reconstruction utilizing allograft bone patella tendon bone graft, MCL repair, partial lateral meniscectomy, chondroplasty of the medial femoral condyle 3.5 years by Dr. Ema Bonds  BMI of over 31    Plan:   I discussed the plan with the patient. He will continue physical therapy working on strength and motion. I will reassess his progress back in 6 weeks      Follow up: Return in about 6 weeks (around 3/22/2023).              Raciel Funes MD

## 2023-03-01 ENCOUNTER — APPOINTMENT (RX ONLY)
Dept: URBAN - METROPOLITAN AREA CLINIC 329 | Facility: CLINIC | Age: 63
Setting detail: DERMATOLOGY
End: 2023-03-01

## 2023-03-01 DIAGNOSIS — L81.4 OTHER MELANIN HYPERPIGMENTATION: ICD-10-CM

## 2023-03-01 DIAGNOSIS — L82.1 OTHER SEBORRHEIC KERATOSIS: ICD-10-CM

## 2023-03-01 DIAGNOSIS — D22 MELANOCYTIC NEVI: ICD-10-CM

## 2023-03-01 PROBLEM — D22.62 MELANOCYTIC NEVI OF LEFT UPPER LIMB, INCLUDING SHOULDER: Status: ACTIVE | Noted: 2023-03-01

## 2023-03-01 PROBLEM — D22.61 MELANOCYTIC NEVI OF RIGHT UPPER LIMB, INCLUDING SHOULDER: Status: ACTIVE | Noted: 2023-03-01

## 2023-03-01 PROBLEM — D22.5 MELANOCYTIC NEVI OF TRUNK: Status: ACTIVE | Noted: 2023-03-01

## 2023-03-01 PROCEDURE — 99203 OFFICE O/P NEW LOW 30 MIN: CPT

## 2023-03-01 PROCEDURE — ? SUNSCREEN RECOMMENDATIONS

## 2023-03-01 PROCEDURE — ? ADDITIONAL NOTES

## 2023-03-01 PROCEDURE — ? FULL BODY SKIN EXAM - DECLINED

## 2023-03-01 PROCEDURE — ? COUNSELING

## 2023-03-01 ASSESSMENT — LOCATION ZONE DERM
LOCATION ZONE: FACE
LOCATION ZONE: ARM
LOCATION ZONE: TRUNK

## 2023-03-01 ASSESSMENT — LOCATION DETAILED DESCRIPTION DERM
LOCATION DETAILED: LEFT SUPERIOR UPPER BACK
LOCATION DETAILED: RIGHT SUPERIOR UPPER BACK
LOCATION DETAILED: LEFT VENTRAL PROXIMAL FOREARM
LOCATION DETAILED: LEFT PROXIMAL POSTERIOR UPPER ARM
LOCATION DETAILED: STERNUM
LOCATION DETAILED: RIGHT CENTRAL ZYGOMA
LOCATION DETAILED: LEFT ANTERIOR PROXIMAL UPPER ARM
LOCATION DETAILED: EPIGASTRIC SKIN
LOCATION DETAILED: RIGHT DISTAL POSTERIOR UPPER ARM
LOCATION DETAILED: RIGHT ANTERIOR PROXIMAL UPPER ARM
LOCATION DETAILED: LEFT SUPERIOR CENTRAL MALAR CHEEK

## 2023-03-01 ASSESSMENT — LOCATION SIMPLE DESCRIPTION DERM
LOCATION SIMPLE: RIGHT ZYGOMA
LOCATION SIMPLE: ABDOMEN
LOCATION SIMPLE: RIGHT UPPER ARM
LOCATION SIMPLE: LEFT UPPER ARM
LOCATION SIMPLE: LEFT CHEEK
LOCATION SIMPLE: LEFT UPPER BACK
LOCATION SIMPLE: LEFT FOREARM
LOCATION SIMPLE: RIGHT UPPER BACK
LOCATION SIMPLE: CHEST

## 2023-03-22 ENCOUNTER — OFFICE VISIT (OUTPATIENT)
Dept: ORTHOPEDIC SURGERY | Age: 63
End: 2023-03-22

## 2023-03-22 DIAGNOSIS — Z48.89 ENCOUNTER FOR POSTOPERATIVE CARE: Primary | ICD-10-CM

## 2023-03-22 PROCEDURE — 99024 POSTOP FOLLOW-UP VISIT: CPT | Performed by: ORTHOPAEDIC SURGERY

## 2023-03-22 NOTE — PROGRESS NOTES
Name: Tor Keith  YOB: 1960  Gender: male  MRN: [id number]              HPI: Tor Keith is a 59 y.o. male 3 months status post arthroscopy of the left knee ACL reconstruction utilizing allograft bone patella tendon bone graft and partial lateral meniscectomy. Operative findings were notable for chondromalacia and patellofemoral chondromalacia left knee. He returns noting he is doing very well. ROS/Meds/PSH/PMH/FH/SH: A ten system review of systems was performed and is negative other than what is in the HPI. Tobacco:  reports that he has never smoked. He has never used smokeless tobacco.  There were no vitals taken for this visit. Physical Examination:  He is an awake alert gentleman ambulating with an antalgic gait on the left side    His right knee is well-healed incisions  The right knee has a range of motion of 0 to 135 degrees  negative Lachman,  negative anterior drawer,   negative posterior drawer  negative pivot. Good tibial step-off,   No varus or valgus laxity at 0 or 30 degrees. Negative lateral joint line tenderness   negative lateral Vik. Negative medial joint line tenderness  negative medial Vik. No evidence of any posterolateral instability. No patellofemoral pain. Negative compression,   negative apprehension  no effusion. Calves Are non-tender,  neurovascularly patient is intact. Negative Homans. MPFL is non-tender. Patient Can fully extend the knee. Good quad tone  No erythema. Negative Dial test.    The left knee incisions have healed  The left knee has a range of motion of 0 to 135 degrees  negative Lachman,  negative anterior drawer,   negative posterior drawer  negative pivot. Good tibial step-off,   No varus or valgus laxity at 0 or 30 degrees. Negative lateral joint line tenderness   negative lateral Vik. Negative medial joint line tenderness  negative medial Vik.    No evidence of any

## 2023-05-19 ENCOUNTER — OFFICE VISIT (OUTPATIENT)
Dept: INTERNAL MEDICINE CLINIC | Facility: CLINIC | Age: 63
End: 2023-05-19
Payer: COMMERCIAL

## 2023-05-19 VITALS
BODY MASS INDEX: 28.28 KG/M2 | RESPIRATION RATE: 16 BRPM | OXYGEN SATURATION: 98 % | HEART RATE: 76 BPM | SYSTOLIC BLOOD PRESSURE: 136 MMHG | HEIGHT: 71 IN | DIASTOLIC BLOOD PRESSURE: 80 MMHG | WEIGHT: 202 LBS

## 2023-05-19 DIAGNOSIS — R73.01 IFG (IMPAIRED FASTING GLUCOSE): ICD-10-CM

## 2023-05-19 DIAGNOSIS — Z12.5 SPECIAL SCREENING FOR MALIGNANT NEOPLASM OF PROSTATE: ICD-10-CM

## 2023-05-19 DIAGNOSIS — E78.2 MIXED HYPERLIPIDEMIA: ICD-10-CM

## 2023-05-19 DIAGNOSIS — H93.8X1 SENSATION OF FULLNESS IN RIGHT EAR: Primary | ICD-10-CM

## 2023-05-19 DIAGNOSIS — R20.2 PARESTHESIA OF BOTH FEET: ICD-10-CM

## 2023-05-19 DIAGNOSIS — G47.33 OSA (OBSTRUCTIVE SLEEP APNEA): ICD-10-CM

## 2023-05-19 LAB
ALBUMIN SERPL-MCNC: 3.4 G/DL (ref 3.2–4.6)
ALBUMIN/GLOB SERPL: 1.1 (ref 0.4–1.6)
ALP SERPL-CCNC: 100 U/L (ref 50–136)
ALT SERPL-CCNC: 38 U/L (ref 12–65)
ANION GAP SERPL CALC-SCNC: 2 MMOL/L (ref 2–11)
AST SERPL-CCNC: 26 U/L (ref 15–37)
BILIRUB DIRECT SERPL-MCNC: 0.2 MG/DL
BILIRUB SERPL-MCNC: 0.7 MG/DL (ref 0.2–1.1)
BUN SERPL-MCNC: 20 MG/DL (ref 8–23)
CALCIUM SERPL-MCNC: 8.9 MG/DL (ref 8.3–10.4)
CHLORIDE SERPL-SCNC: 108 MMOL/L (ref 101–110)
CHOLEST SERPL-MCNC: 265 MG/DL
CO2 SERPL-SCNC: 28 MMOL/L (ref 21–32)
CREAT SERPL-MCNC: 0.9 MG/DL (ref 0.8–1.5)
ERYTHROCYTE [DISTWIDTH] IN BLOOD BY AUTOMATED COUNT: 13.6 % (ref 11.9–14.6)
EST. AVERAGE GLUCOSE BLD GHB EST-MCNC: 120 MG/DL
GLOBULIN SER CALC-MCNC: 3.2 G/DL (ref 2.8–4.5)
GLUCOSE SERPL-MCNC: 105 MG/DL (ref 65–100)
HBA1C MFR BLD: 5.8 % (ref 4.8–5.6)
HCT VFR BLD AUTO: 46.8 % (ref 41.1–50.3)
HDLC SERPL-MCNC: 83 MG/DL (ref 40–60)
HDLC SERPL: 3.2
HGB BLD-MCNC: 15.8 G/DL (ref 13.6–17.2)
LDLC SERPL CALC-MCNC: 167.8 MG/DL
MCH RBC QN AUTO: 30.3 PG (ref 26.1–32.9)
MCHC RBC AUTO-ENTMCNC: 33.8 G/DL (ref 31.4–35)
MCV RBC AUTO: 89.8 FL (ref 82–102)
NRBC # BLD: 0 K/UL (ref 0–0.2)
PLATELET # BLD AUTO: 213 K/UL (ref 150–450)
PMV BLD AUTO: 10.7 FL (ref 9.4–12.3)
POTASSIUM SERPL-SCNC: 4.2 MMOL/L (ref 3.5–5.1)
PROT SERPL-MCNC: 6.6 G/DL (ref 6.3–8.2)
PSA SERPL-MCNC: 0.8 NG/ML
RBC # BLD AUTO: 5.21 M/UL (ref 4.23–5.6)
SODIUM SERPL-SCNC: 138 MMOL/L (ref 133–143)
TRIGL SERPL-MCNC: 71 MG/DL (ref 35–150)
VIT B12 SERPL-MCNC: 948 PG/ML (ref 193–986)
VLDLC SERPL CALC-MCNC: 14.2 MG/DL (ref 6–23)
WBC # BLD AUTO: 5.4 K/UL (ref 4.3–11.1)

## 2023-05-19 PROCEDURE — 99214 OFFICE O/P EST MOD 30 MIN: CPT | Performed by: FAMILY MEDICINE

## 2023-05-19 ASSESSMENT — PATIENT HEALTH QUESTIONNAIRE - PHQ9
1. LITTLE INTEREST OR PLEASURE IN DOING THINGS: 0
SUM OF ALL RESPONSES TO PHQ QUESTIONS 1-9: 0
2. FEELING DOWN, DEPRESSED OR HOPELESS: 0
SUM OF ALL RESPONSES TO PHQ9 QUESTIONS 1 & 2: 0

## 2023-05-19 NOTE — PROGRESS NOTES
Doni Covington DO  Diplomate of the [employer] of [address] [place] [address] Internal Medicine      Miguelangel Cruz (: 1960) is a 59 y.o. male, here for evaluation of the following chief complaint(s):  Ear Fullness       ASSESSMENT/PLAN:  1. Sensation of fullness in right ear  2. IFG (impaired fasting glucose)  -     Basic Metabolic Panel; Future  -     CBC; Future  -     Hemoglobin A1C; Future  3. MOHIT (obstructive sleep apnea)  4. Paresthesia of both feet  -     Vitamin B12; Future  5. Mixed hyperlipidemia  -     Hepatic Function Panel; Future  -     Lipid Panel; Future  6. Special screening for malignant neoplasm of prostate  -     PSA Screening; Future     -Restart Flonase. Continue with antihistamine. If not improving, will get in with ENT. Demonstrated proper nasal spray technique with specific instruction to avoid the nasal septum.  -Will recheck A1c, continue to periodically monitor. Encourage healthy eating/exercise.  -Encourage continued use of CPAP. -Pros and cons of PSA testing for prostate screening were discussed. The patient does wish to proceed. -Declines colonoscopy---will do FIT. -Check B12, EMG/NCV if worsening. SUBJECTIVE/OBJECTIVE:  HPI:  -Has been having a sensation of fullness intermittently in his right ear over the past several months. He does previously have a history of sinus surgery as well. He is currently taking an antihistamine but no Flonase. He denies any pain or significant decrease in hearing.  -He has a family history of neuropathy. He has noticed that occasionally he will have some mild numbness and tingling in his feet. Family history of B12 deficiency as well.  -Continues on CPAP as directed. -IFG: He denies any symptoms of diabetes. Has not had any symptoms of polyuria, polydipsia, or hypoglycemia. ROS negative except as noted above today.     Social History     Tobacco Use    Smoking status: Never    Smokeless tobacco: Never

## 2023-06-27 ENCOUNTER — OFFICE VISIT (OUTPATIENT)
Dept: ORTHOPEDIC SURGERY | Age: 63
End: 2023-06-27
Payer: COMMERCIAL

## 2023-06-27 DIAGNOSIS — Z47.89 ORTHOPEDIC AFTERCARE: Primary | ICD-10-CM

## 2023-06-27 PROCEDURE — 99212 OFFICE O/P EST SF 10 MIN: CPT | Performed by: ORTHOPAEDIC SURGERY

## 2024-02-26 NOTE — PROGRESS NOTES
person, place, and time.  Memory appears intact and mood is normal.  No gross sensorimotor deficits are present.          ASSESSMENT:  (Medical Decision Making)      Diagnosis Orders   1. MOHIT (obstructive sleep apnea)  DME - DURABLE MEDICAL EQUIPMENT - Patient is using, compliant and benefiting from Pap therapy. Continue current settings as AHI is down to 1.3 events per hour.        2. Nocturnal hypoxemia  Improved with PAP therapy           PLAN:    Continue CPAP 9.8 cm H2O with nightly compliance  New CPAP supplies ordered  Recommendations as above  Follow-up in 1 year or sooner if needed    Orders Placed This Encounter   Procedures    DME - DURABLE MEDICAL EQUIPMENT     [facility] [place] [facility]  Phone: [address]  [place] [address]  Dept: [phone]      Patient Name: Bin Mosqueda  : 1960  Gender: male  Address: [address] [place] [address] [facility] [address]   Patient phone: [phone] (home)       Primary Insurance: Payor: BCBS / Plan: [place] / Product Type: *No Product type* /   Subscriber ID: [id number] - ([facility])      AMB Supply Order  Order Details     DME Location: Patient Preference   Order Date: 2024   The encounter diagnosis was MOHIT (obstructive sleep apnea).             (  X   )Supplies Needed       CPAP Machine   (     ) CPAP Unit  (     ) Auto CPAP Unit  (     ) BiLevel Unit  (     ) Auto BiLevel Unit  (     ) ASV        (     ) Bilevel ST      Length of need: 12 months    Pressure:  9.8 cmH20  EPR: 2    Starting Ramp Pressure:   cm H20  Ramp Time: min        Patient had a diagnostic Apnea Hypopnea Index (AHI) of :  24.9  *SUPPLIES* Replace all as needed, or per coverage guidelines     Masks Type:  (    ) -Full Face Mask (1 per 3 mon)  (    ) -Full Mask (1 per month) Interface/Cushion      ( x ) -Nasal Mask (1 per 3 mon)  ( x  ) - Nasal Mask (1 per month) Interface/Cushion  (  x   )

## 2024-02-27 ENCOUNTER — OFFICE VISIT (OUTPATIENT)
Dept: SLEEP MEDICINE | Age: 64
End: 2024-02-27
Payer: COMMERCIAL

## 2024-02-27 VITALS
OXYGEN SATURATION: 95 % | HEIGHT: 71 IN | TEMPERATURE: 97.2 F | HEART RATE: 77 BPM | BODY MASS INDEX: 29.12 KG/M2 | SYSTOLIC BLOOD PRESSURE: 136 MMHG | DIASTOLIC BLOOD PRESSURE: 84 MMHG | WEIGHT: 208 LBS

## 2024-02-27 DIAGNOSIS — G47.34 NOCTURNAL HYPOXEMIA: ICD-10-CM

## 2024-02-27 DIAGNOSIS — G47.33 OSA (OBSTRUCTIVE SLEEP APNEA): Primary | ICD-10-CM

## 2024-02-27 PROCEDURE — 99213 OFFICE O/P EST LOW 20 MIN: CPT | Performed by: NURSE PRACTITIONER

## 2024-02-27 ASSESSMENT — SLEEP AND FATIGUE QUESTIONNAIRES
ESS TOTAL SCORE: 3
HOW LIKELY ARE YOU TO NOD OFF OR FALL ASLEEP WHILE SITTING INACTIVE IN A PUBLIC PLACE: 0
HOW LIKELY ARE YOU TO NOD OFF OR FALL ASLEEP IN A CAR, WHILE STOPPED FOR A FEW MINUTES IN TRAFFIC: 0
HOW LIKELY ARE YOU TO NOD OFF OR FALL ASLEEP WHEN YOU ARE A PASSENGER IN A CAR FOR AN HOUR WITHOUT A BREAK: 0
HOW LIKELY ARE YOU TO NOD OFF OR FALL ASLEEP WHILE SITTING AND READING: 0
HOW LIKELY ARE YOU TO NOD OFF OR FALL ASLEEP WHILE LYING DOWN TO REST IN THE AFTERNOON WHEN CIRCUMSTANCES PERMIT: 3
HOW LIKELY ARE YOU TO NOD OFF OR FALL ASLEEP WHILE SITTING QUIETLY AFTER LUNCH WITHOUT ALCOHOL: 0
HOW LIKELY ARE YOU TO NOD OFF OR FALL ASLEEP WHILE WATCHING TV: 0

## 2024-02-27 NOTE — PATIENT INSTRUCTIONS
Continue CPAP 9.8 cm H2O with nightly compliance  New CPAP supplies ordered  Recommendations as above  Follow-up in 1 year or sooner if needed

## 2024-10-08 ENCOUNTER — OFFICE VISIT (OUTPATIENT)
Dept: ORTHOPEDIC SURGERY | Age: 64
End: 2024-10-08
Payer: COMMERCIAL

## 2024-10-08 VITALS — BODY MASS INDEX: 28.42 KG/M2 | WEIGHT: 203 LBS | HEIGHT: 71 IN

## 2024-10-08 DIAGNOSIS — M43.06 LUMBAR SPONDYLOLYSIS: Primary | ICD-10-CM

## 2024-10-08 PROCEDURE — 99204 OFFICE O/P NEW MOD 45 MIN: CPT | Performed by: PHYSICIAN ASSISTANT

## 2024-10-08 RX ORDER — MELOXICAM 15 MG/1
15 TABLET ORAL DAILY
Qty: 30 TABLET | Refills: 2 | Status: SHIPPED | OUTPATIENT
Start: 2024-10-08 | End: 2025-01-06

## 2024-10-08 NOTE — PROGRESS NOTES
Reactions    Penicillins Other (See Comments)     Headaches        Patient Medications    Current Outpatient Medications   Medication Sig Dispense Refill    cyanocobalamin 1000 MCG tablet Take 1 tablet by mouth daily      fluticasone (FLONASE) 50 MCG/ACT nasal spray 2 sprays by Nasal route daily as needed      levocetirizine (XYZAL) 5 MG tablet Take 1 tablet by mouth nightly       No current facility-administered medications for this visit.                   Review of Systems:  As per HPI.  Pertinent positives and negatives are addressed with the patient, particularly those related to musculoskeletal concerns.   Other non-emergent concerns were referred back to the primary care physician.      PHYSICAL EXAMINATION:     The patient appears their stated age and they are in no distress.  Ht 1.803 m (5' 11\")   Wt 92.1 kg (203 lb)   BMI 28.31 kg/m²         Neuro:   Reflexes  Knees: Normal  Ankles: Normal        Sensory    Sensation to light touch intact L3-S1      Motor    Hip Flexion: grossly normal    Knee Extension:  grossly normal    Ankle Dorsiflexion: grossly normal    Great Toe Exension:  grossly normal       Point tenderness: Negative    Gait: Normal     Good painless range of motion of the hips          IMAGING:     MRI Result (most recent):  MRI KNEE LEFT WO CONTRAST         3 views of the lumbar spine reveal fair alignment and lordosis.  Lower lumbar facet arthropathy, spondylosis.  Mild loss of disc height.          ASSESSMENT AND PLAN:     The patient has primarily right-sided but bilateral lower back pain without radicular features.  I recommend a course of physical therapy and Mobic as needed.  We could consider a muscle relaxer or short course of steroids in the future as an option as well but will hold off on this for now.  Plan to follow-up in 2 months for recheck of symptoms or sooner as needed.          4--this is a chronic illness/condition with exacerbation

## 2025-02-26 ENCOUNTER — TELEPHONE (OUTPATIENT)
Dept: SLEEP MEDICINE | Age: 65
End: 2025-02-26

## 2025-02-27 ENCOUNTER — OFFICE VISIT (OUTPATIENT)
Dept: SLEEP MEDICINE | Age: 65
End: 2025-02-27
Payer: COMMERCIAL

## 2025-02-27 VITALS
HEIGHT: 71 IN | SYSTOLIC BLOOD PRESSURE: 155 MMHG | OXYGEN SATURATION: 95 % | RESPIRATION RATE: 17 BRPM | HEART RATE: 100 BPM | BODY MASS INDEX: 28.84 KG/M2 | WEIGHT: 206 LBS | DIASTOLIC BLOOD PRESSURE: 94 MMHG

## 2025-02-27 DIAGNOSIS — G47.34 NOCTURNAL HYPOXEMIA: ICD-10-CM

## 2025-02-27 DIAGNOSIS — G47.33 OSA (OBSTRUCTIVE SLEEP APNEA): Primary | ICD-10-CM

## 2025-02-27 PROCEDURE — 99213 OFFICE O/P EST LOW 20 MIN: CPT | Performed by: NURSE PRACTITIONER

## 2025-02-27 ASSESSMENT — SLEEP AND FATIGUE QUESTIONNAIRES
HOW LIKELY ARE YOU TO NOD OFF OR FALL ASLEEP WHILE WATCHING TV: WOULD NEVER DOZE
HOW LIKELY ARE YOU TO NOD OFF OR FALL ASLEEP WHILE SITTING AND READING: WOULD NEVER DOZE
ESS TOTAL SCORE: 3
HOW LIKELY ARE YOU TO NOD OFF OR FALL ASLEEP WHILE SITTING AND TALKING TO SOMEONE: WOULD NEVER DOZE
HOW LIKELY ARE YOU TO NOD OFF OR FALL ASLEEP WHEN YOU ARE A PASSENGER IN A CAR FOR AN HOUR WITHOUT A BREAK: WOULD NEVER DOZE
HOW LIKELY ARE YOU TO NOD OFF OR FALL ASLEEP WHILE SITTING QUIETLY AFTER LUNCH WITHOUT ALCOHOL: WOULD NEVER DOZE
HOW LIKELY ARE YOU TO NOD OFF OR FALL ASLEEP WHILE SITTING INACTIVE IN A PUBLIC PLACE: WOULD NEVER DOZE
HOW LIKELY ARE YOU TO NOD OFF OR FALL ASLEEP WHILE LYING DOWN TO REST IN THE AFTERNOON WHEN CIRCUMSTANCES PERMIT: HIGH CHANCE OF DOZING
HOW LIKELY ARE YOU TO NOD OFF OR FALL ASLEEP IN A CAR, WHILE STOPPED FOR A FEW MINUTES IN TRAFFIC: WOULD NEVER DOZE

## 2025-02-27 ASSESSMENT — ENCOUNTER SYMPTOMS
VOICE CHANGE: 0
SORE THROAT: 0
SHORTNESS OF BREATH: 0
APNEA: 0

## 2025-02-27 NOTE — PATIENT INSTRUCTIONS
Continue with PAP usage nightly. Excellent compliance.   Please call the office or send a [employer] message if you have any issues with your pressures over the next year.

## 2025-02-27 NOTE — PROGRESS NOTES
[facility]  [address]  [place] [address]  [phone]    Patient Name:  Bin Mosqueda  YOB: 1960      Office Visit 2/27/2025    CHIEF COMPLAINT:    Chief Complaint   Patient presents with    Sleep Apnea         HISTORY OF PRESENT ILLNESS:  Patient is a 65 y.o. male seen today for follow up of MOHIT.  Diagnostic sleep study on 01/06/2020 with an AHI of 24.9 and lowest oxygen saturation 62%. Patient is prescribed cpap therapy with a humidifier set at 4 -20 cm with a nasal cusion mask. Most recent download reveals AHI on PAP therapy is 1.7, leak is 0.0 and 4.9 at 95th percentile, and the hourly usage is 7 hours 13 minutes nightly. The overall use is 2636 hours with days greater than four hours at 365/365. The patient is compliant with the PAP therapy and is feeling better as a result. Since his previous visit, his PAP settings have changed from a constant pressure of 9.8 cm H2O to auto titration pressures of 4-20 cm.  Patient denies any significant changes in health history.  His blood pressure is elevated in the clinic today, however he states that his blood pressure was 120/77 at home last week.  Patient currently takes Xyzal as needed for seasonal allergies.  He sleeps an average of 7 hours per night without any disruptions and feels refreshed in the morning.  He denies symptoms of nocturia.  Patient does not wish to move forward with any DME company for supplies today.  He states he is here only for follow-up for compliance.  He denies any questions or concerns regarding his PAP today. [place] remains 3/24, which is synonymous with the year prior.        REVIEW OF SYSTEMS:    Review of Systems   Constitutional:  Negative for activity change, fatigue and unexpected weight change.   HENT:  Negative for congestion, nosebleeds, sore throat and voice change.    Respiratory:  Negative for apnea and shortness of breath.    Cardiovascular:  Negative for leg

## 2025-06-20 ENCOUNTER — OFFICE VISIT (OUTPATIENT)
Dept: ORTHOPEDIC SURGERY | Age: 65
End: 2025-06-20
Payer: COMMERCIAL

## 2025-06-20 DIAGNOSIS — S46.011A TRAUMATIC TEAR OF RIGHT ROTATOR CUFF, UNSPECIFIED TEAR EXTENT, INITIAL ENCOUNTER: Primary | ICD-10-CM

## 2025-06-20 DIAGNOSIS — M79.601 PAIN OF RIGHT UPPER EXTREMITY: ICD-10-CM

## 2025-06-20 PROCEDURE — 99204 OFFICE O/P NEW MOD 45 MIN: CPT | Performed by: STUDENT IN AN ORGANIZED HEALTH CARE EDUCATION/TRAINING PROGRAM

## 2025-06-20 RX ORDER — MELOXICAM 15 MG/1
15 TABLET ORAL DAILY
Qty: 30 TABLET | Refills: 1 | Status: SHIPPED | OUTPATIENT
Start: 2025-06-20

## 2025-06-20 NOTE — PROGRESS NOTES
Name: Bin Mosqueda  YOB: 1960  Gender: male  MRN: [id number]  Date of Encounter:  6/20/2025         CC  Chief Complaint   Patient presents with    Shoulder Pain     R       HPI:    History of Present Illness  The patient is a 65-year-old male who presents for evaluation of right shoulder pain.    He reports experiencing right shoulder pain, which he attributes to a fall on a rock at a 45-degree angle during a hiking trip approximately 13 days ago. He also suspects the presence of a bruised or cracked rib. The pain has shown some improvement over the past two weeks but remains significant. He notes an asymmetry in his shoulder region. The pain is severe enough to disrupt his sleep. He has experienced sudden, unexpected stabs of pain on several occasions over the past two weeks and is concerned about potentially exacerbating the injury. He is right-handed.        PAST HISTORY:   Past medical, surgical, family, social history and allergies reviewed by me.       REVIEW OF SYSTEMS:   As noted in HPI.     PHYSICAL EXAMINATION:   Physical Exam  In the right shoulder, flexion is about 160 degrees, abduction is 160 degrees, external rotation is 70 degrees, and internal rotation is to T10. Internal rotation strength is 5 out of 5, external rotation strength is 4 out of 5, and abduction strength is 4+ out of 5. Painful arc test is positive. Drop arm test is positive. Hare test is negative. Neer's test is negative. Cross arm test is negative. Yergason's test is negative. Speed's test is positive.    DIAGNOSTIC IMAGING:   XR AP Grashey and axillary views of R shoulder shows mild glenohumeral arthropathy. I independently interpreted XR taken today    ASSESSMENT/PLAN:   1. Traumatic tear of right rotator cuff, unspecified tear extent, initial encounter    2. Pain of right upper extremity         Imaging: MR shoulder  Rx: mobic 15mg PO daily  Rehab: HEP provided, consider PT pending MR      Orders /

## 2025-07-08 ENCOUNTER — OFFICE VISIT (OUTPATIENT)
Dept: ORTHOPEDIC SURGERY | Age: 65
End: 2025-07-08
Payer: COMMERCIAL

## 2025-07-08 DIAGNOSIS — S46.812S INFRASPINATUS TENDON TEAR, LEFT, SEQUELA: Primary | ICD-10-CM

## 2025-07-08 PROCEDURE — 99214 OFFICE O/P EST MOD 30 MIN: CPT | Performed by: STUDENT IN AN ORGANIZED HEALTH CARE EDUCATION/TRAINING PROGRAM

## 2025-07-08 NOTE — PROGRESS NOTES
Name: Bin Mosqueda  YOB: 1960  Gender: male  MRN: [id number]  Date of Encounter:  7/8/2025         CC  Chief Complaint   Patient presents with    Shoulder Pain    Follow-up       HPI:    History of Present Illness  The patient is a 65-year-old man presenting for follow-up of right shoulder pain.    He reports persistent discomfort in his right shoulder, which he describes as constantly achy. He has noticed a slight decrease in pain during movement, but still requires assistance. He finds some relief with Mobic, particularly at night, which aids his sleep.        PAST HISTORY:   Past medical, surgical, family, social history and allergies reviewed by me.       REVIEW OF SYSTEMS:   As noted in HPI.     PHYSICAL EXAMINATION:   Physical Exam  In the right shoulder, flexion is about 160 degrees, abduction is 160 degrees, external rotation is 70 degrees, and internal rotation is to T10. Internal rotation strength is 5 out of 5, external rotation strength is 4 out of 5, and abduction strength is 4+ out of 5.    DIAGNOSTIC IMAGING:   MR Shoulder Right 6/20/25  IMPRESSION:     New at least 4.4 cm wide full-thickness tear of the entire infraspinatus tendon  insertion and the posterior majority of the supraspinatus tendon insertion,  retracted as much is 2.2 cm.     Anterolateral deltoid muscle strain/contusion.     Severe AC joint arthrosis. Mild subacromial osseous ridging.     Slight interstitial partial tearing in the distal subscapularis tendon  insertion, similar to previous.     Excess subacromial/subdeltoid bursal fluid     Mild chondral thinning in the humeral head and glenoid.     Long biceps tendinosis, similar to previous.       ASSESSMENT/PLAN:   1. Infraspinatus tendon tear, left, sequela        Rx: mobic 15mg PO daily  Rehab: HEP provided previously  Referral: Sports    Orders / medications today:   Orders Placed This Encounter    Ambulatory referral to Orthopedic Surgery     Referral

## 2025-07-23 ENCOUNTER — OFFICE VISIT (OUTPATIENT)
Dept: ORTHOPEDIC SURGERY | Age: 65
End: 2025-07-23

## 2025-07-23 VITALS — BODY MASS INDEX: 26.6 KG/M2 | WEIGHT: 190 LBS | HEIGHT: 71 IN

## 2025-07-23 DIAGNOSIS — S46.011A TRAUMATIC TEAR OF RIGHT ROTATOR CUFF, UNSPECIFIED TEAR EXTENT, INITIAL ENCOUNTER: Primary | ICD-10-CM

## 2025-07-23 DIAGNOSIS — M79.601 PAIN OF RIGHT UPPER EXTREMITY: ICD-10-CM

## 2025-07-23 NOTE — PROGRESS NOTES
Name: Bin Mosqueda  YOB: 1960  Gender: male  MRN: [id number]      CC: Shoulder Pain (R)       HPI: Bin Mosqueda is a 65 y.o. male who presents with Shoulder Pain (R)  .   Mobic 15- does help with pain at night.   No previous injuries or this shoudler.         ROS/Meds/PSH/PMH/FH/SH: I personally reviewed the patients standard intake form.  Below are the pertinents    Tobacco:  reports that he has never smoked. He has never used smokeless tobacco.  Diabetes: {[facility] diabetes control:83987}  Other: ***    Physical Examination:  General: no acute distress  Lungs: breathing easily  CV: regular rhythm by pulse  {body part:02313}:***      Imaging:   ***  All imaging interpreted by myself Ramírez Tracy MD independent of radiology review        Assessment:   No diagnosis found.    Plan:   ***              Ramírez Tracy MD, FAAOS  Orthopaedics and Sports Medicine

## 2025-07-23 NOTE — PROGRESS NOTES
Name: Bin Mosqueda  YOB: 1960  Gender: male  MRN: [id number]    CC: Shoulder Pain (R)  He is a new patient of mine referred by Dr. Hany Cortés for right shoulder pain. He reports experiencing right shoulder pain, which he attributes to a fall on a rock at a 45-degree angle during a hiking trip in early June. He suspects he may have bruised or cracked a rib as well. He reports persistent discomfort in his right shoulder, which he describes as constantly achy His range of motion has improved over the last month, but still has limitations with strength and ROM with specific motions. He takes mobic to help him sleep at night. He has no previous injuries to this shoulder. He has not completed any formal physical therapy.  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  He notes all of these activities have been limited due to his fall.     ROS/Meds/PSH/PMH/FH/SH: I personally reviewed the patients standard intake form.  Below are the pertinents    Tobacco:  reports that he has never smoked. He has never used smokeless tobacco.  Diabetes: none  Other: none    Physical Examination:  General: no acute distress  Lungs: breathing easily  CV: regular rhythm by pulse  Right Shoulder:   He can forward flex to 160 degrees.  He can abduct to 160 degrees.  He can internally rotate to T10.  4 out of 5 scaption strength.  4 out of 5 external rotation strength.  5 out of 5 internal rotation strength.  Motor and sensory tact distally.  Distal radial pulse 2+.  Positive speeds and tender palpate over the biceps tendon.  Tender palpation of the AC joint.  Positive impingement signs.    Imaging:     MRI reviewed today from an outside provider.  It was seen in our PACS system.  He does have a full-thickness supra and infraspinatus tendon tears retracted about 20 mm.  No significant muscular atrophy noted to the supra or infraspinatus muscle bodies. Subscap

## 2025-07-28 DIAGNOSIS — S46.812S INFRASPINATUS TENDON TEAR, LEFT, SEQUELA: ICD-10-CM

## 2025-07-28 DIAGNOSIS — M79.601 PAIN OF RIGHT UPPER EXTREMITY: ICD-10-CM

## 2025-07-28 DIAGNOSIS — S46.011A TRAUMATIC TEAR OF RIGHT ROTATOR CUFF, UNSPECIFIED TEAR EXTENT, INITIAL ENCOUNTER: Primary | ICD-10-CM

## 2025-08-01 ENCOUNTER — OFFICE VISIT (OUTPATIENT)
Dept: ORTHOPEDIC SURGERY | Age: 65
End: 2025-08-01

## 2025-08-01 DIAGNOSIS — S46.011A TRAUMATIC TEAR OF RIGHT ROTATOR CUFF, UNSPECIFIED TEAR EXTENT, INITIAL ENCOUNTER: Primary | ICD-10-CM

## 2025-08-01 DIAGNOSIS — Z01.818 PREOP EXAMINATION: ICD-10-CM

## 2025-08-01 DIAGNOSIS — M25.562 LEFT KNEE PAIN, UNSPECIFIED CHRONICITY: ICD-10-CM

## 2025-08-01 RX ORDER — ONDANSETRON 4 MG/1
4 TABLET, ORALLY DISINTEGRATING ORAL EVERY 6 HOURS
Qty: 20 TABLET | Refills: 0 | Status: SHIPPED | OUTPATIENT
Start: 2025-08-01

## 2025-08-01 RX ORDER — OXYCODONE HYDROCHLORIDE 5 MG/1
5 TABLET ORAL EVERY 4 HOURS PRN
Qty: 30 TABLET | Refills: 0 | Status: SHIPPED | OUTPATIENT
Start: 2025-08-01 | End: 2025-08-06

## 2025-08-01 NOTE — PROGRESS NOTES
Name: Bin Mosqueda  YOB: 1960  Gender: male  MRN: [id number]    CC: Shoulder Pain (R)     HPI: Bin Mosqueda is a 65 y.o. male who presents today for preoperative evaluation.     He was initially a referral from Dr. Cortés.  He started having right shoulder pain after falling on a rock during a hiking trip in early June.  Since this injury has had significant limitations in his strength.  Did have an MRI that showed a full-thickness rotator cuff tear and has elected to get this fixed.  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     Today the patient was provided with an ultrasling. They will got to [facility] for physical therapy following surgery.       Current Outpatient Medications:     oxyCODONE (ROXICODONE) 5 MG immediate release tablet, Take 1 tablet by mouth every 4 hours as needed for Pain for up to 5 days. Intended supply: 5 days. Take lowest dose possible to manage pain Max Daily Amount: 30 mg, Disp: 30 tablet, Rfl: 0    ondansetron (ZOFRAN-ODT) 4 MG disintegrating tablet, Take 1 tablet by mouth every 6 hours Take 20 minutes prior to pain medication for nausea prevention, Disp: 20 tablet, Rfl: 0    meloxicam (MOBIC) 15 MG tablet, Take 1 tablet by mouth daily, Disp: 30 tablet, Rfl: 1    cyanocobalamin 1000 MCG tablet, Take 1 tablet by mouth daily, Disp: , Rfl:     fluticasone (FLONASE) 50 MCG/ACT nasal spray, 2 sprays by Nasal route daily as needed, Disp: , Rfl:     levocetirizine (XYZAL) 5 MG tablet, Take 1 tablet by mouth nightly, Disp: , Rfl:   Allergies   Allergen Reactions    Penicillins Other (See Comments)     Headaches     Past Medical History:   Diagnosis Date    Allergic rhinitis     Chronic sphenoidal sinusitis     IFG (impaired fasting glucose) 5/13/2021    IFG (impaired fasting glucose) 5/13/2021    Mixed hyperlipidemia 12/5/2019    MOHIT (obstructive

## 2025-08-01 NOTE — PROGRESS NOTES
Patient was fitted and instructed on the use of a size L Ultrasling for the patient's right shoulder.   I provided the following instructions along with proper fitting as noted below.   Fitting instructions included   How to adjusting the thumb support and avoiding irritation to hand. Patient was instructed this should not be used until the block given at surgery has worn off and patient has regained feeling in hand.   How to manage the quick release connection.   The shoulder straps are adjusted to insure correct fit including trimming any excess material.   The shoulder strap crosses over the opposite shoulder being sure to avoid excess pull on neck  Placement of pillow placed at the waist line of the affected shoulder with the Velcro facing away from body allowing for sling attachment.   Positioning the elbow in sling as far back as possible providing adequate support  Keeping the forearm close to the body preventing excessive ER   Keeping the hand higher than the elbow to allow for adequate support of arm in sling and avoiding excess swelling to hand.   How to detach the shoulder strap familia and open front panel to allow for proper hygiene.  Patient was informed waist belt should stay in place at all times unless told otherwise by the Doctor or Physical Therapist.   Patient was also made aware to bring an oversized shirt to surgery to provide coverage and comfort when leaving the hospital.   The patient was instructed to bring ultrasling, oversized shirt, and iceman pad (if purchased or prescribed) with them on the day of surgery.   Patient was fitted and instructed on the use of an Ultrasling for the patients right shoulder. I fitted patient with a size L ultrasling. Patient was instructed to position elbow in sling as far back as possible and that the arm should be internally rotated lying nicely against abdomen. I demonstrated how the shoulder strap crosses over the opposite shoulder and connects to the

## 2025-08-14 ENCOUNTER — PROCEDURE VISIT (OUTPATIENT)
Dept: ORTHOPEDIC SURGERY | Age: 65
End: 2025-08-14

## 2025-08-14 ENCOUNTER — OUTSIDE SERVICES (OUTPATIENT)
Dept: ORTHOPEDIC SURGERY | Age: 65
End: 2025-08-14

## 2025-08-14 DIAGNOSIS — M19.011 ARTHRITIS OF RIGHT ACROMIOCLAVICULAR JOINT: ICD-10-CM

## 2025-08-14 DIAGNOSIS — M75.21 BICEPS TENDINITIS OF RIGHT UPPER EXTREMITY: ICD-10-CM

## 2025-08-14 DIAGNOSIS — S46.011A TRAUMATIC TEAR OF RIGHT ROTATOR CUFF, UNSPECIFIED TEAR EXTENT, INITIAL ENCOUNTER: Primary | ICD-10-CM

## 2025-08-14 DIAGNOSIS — M75.41 SUBACROMIAL IMPINGEMENT OF RIGHT SHOULDER: ICD-10-CM

## 2025-08-14 DIAGNOSIS — M75.21 BICEPS TENDINITIS OF RIGHT SHOULDER: ICD-10-CM

## 2025-08-14 DIAGNOSIS — M75.121 COMPLETE TEAR OF RIGHT ROTATOR CUFF, UNSPECIFIED WHETHER TRAUMATIC: Primary | ICD-10-CM

## 2025-08-15 ENCOUNTER — TELEPHONE (OUTPATIENT)
Dept: SLEEP MEDICINE | Age: 65
End: 2025-08-15

## 2025-08-15 DIAGNOSIS — G47.33 OSA (OBSTRUCTIVE SLEEP APNEA): Primary | ICD-10-CM

## 2025-08-25 ENCOUNTER — OFFICE VISIT (OUTPATIENT)
Dept: ORTHOPEDIC SURGERY | Age: 65
End: 2025-08-25

## 2025-08-25 DIAGNOSIS — Z09 STATUS POST ORTHOPEDIC SURGERY, FOLLOW-UP EXAM: Primary | ICD-10-CM

## (undated) DEVICE — STRIP,CLOSURE,WOUND,MEDI-STRIP,1/2X4: Brand: MEDLINE

## (undated) DEVICE — SUTURE PDS II SZ 0 L27IN ABSRB VLT L26MM CT-2 1/2 CIR Z334H

## (undated) DEVICE — BNDG,ELSTC,MATRIX,STRL,6"X5YD,LF,HOOK&LP: Brand: MEDLINE

## (undated) DEVICE — ZIMMER® STERILE DISPOSABLE TOURNIQUET CUFF PROTECTIVE SLEEVE (FOR USE WITH ZIMMER 34 IN. (86 CM) DISPOSABLE CUFF)

## (undated) DEVICE — SUTURE VCRL SZ 0 L27IN ABSRB UD L36MM CP-1 1/2 CIR REV CUT J267H

## (undated) DEVICE — KNIFE SURG 10MM GRFT DISP FOR ACL RECON

## (undated) DEVICE — REM POLYHESIVE ADULT PATIENT RETURN ELECTRODE: Brand: VALLEYLAB

## (undated) DEVICE — SOLUTION IRRIG 3000ML 0.9% SOD CHL USP UROMATIC PLAS CONT

## (undated) DEVICE — SET IRRIG DST FLX M CONN

## (undated) DEVICE — ZIMMER® STERILE DISPOSABLE TOURNIQUET CUFF WITH PLC, DUAL PORT, SINGLE BLADDER, 30 IN. (76 CM)

## (undated) DEVICE — STOCKINETTE,IMPERVIOUS,12X48,STERILE: Brand: MEDLINE

## (undated) DEVICE — BUTTON SWITCH PENCIL BLADE ELECTRODE, HOLSTER: Brand: EDGE

## (undated) DEVICE — PADDING CAST W6INXL4YD COT LO LINTING WYTEX

## (undated) DEVICE — OCCLUSIVE GAUZE STRIP,3% BISMUTH TRIBROMOPHENATE IN PETROLATUM BLEND: Brand: XEROFORM

## (undated) DEVICE — 4-PORT MANIFOLD: Brand: NEPTUNE 2

## (undated) DEVICE — SUTURE ETHLN SZ 2-0 L18IN NONABSORBABLE BLK L26MM PS 3/8 585H

## (undated) DEVICE — Z INACTIVE USE 2854097 SPONGE GZ W4XL4IN COT 12 PLY TYP VII WVN C FLD DSGN

## (undated) DEVICE — INTENT TO BE USED WITH SUTURE MATERIAL FOR TISSUE CLOSURE: Brand: RICHARD-ALLAN®  NEEDLE 1/2 CIRCLE REVERSE CUTTING

## (undated) DEVICE — [AGGRESSIVE 6-FLUTE BARREL BUR, ARTHROSCOPIC SHAVER BLADE,  DO NOT RESTERILIZE,  DO NOT USE IF PACKAGE IS DAMAGED,  KEEP DRY,  KEEP AWAY FROM SUNLIGHT]: Brand: FORMULA

## (undated) DEVICE — NEEDLE HYPO 18GA L1.5IN PNK S STL HUB POLYPR SHLD REG BVL

## (undated) DEVICE — 90-S ACCELERATOR, SUCTION PROBE, NON-BENDABLE, MAX CUT LEVEL 11: Brand: SERFAS ENERGY

## (undated) DEVICE — SPLINT KNEE L20IN FOR 32IN THGH UNIV FOAM 3 PC DSGN TRIMMED

## (undated) DEVICE — PRECISION THIN (9.0 X 0.38 X 31.0MM)

## (undated) DEVICE — BANDAGE COMPR SELF ADH 5 YDX6 IN TAN STRL PREMIERPRO LF

## (undated) DEVICE — (D)STRIP SKN CLSR 0.5X4IN WHT --

## (undated) DEVICE — STERILE HOOK LOCK LATEX FREE ELASTIC BANDAGE 6INX5YD: Brand: HOOK LOCK™

## (undated) DEVICE — INTEGUSEAL MICROBIAL SEALANT: Brand: AVANOS

## (undated) DEVICE — SPLINT KNEE UNIV FOR LESS THAN 36IN L20IN FOAM LAM E CNTCT

## (undated) DEVICE — SKIN MARKER,REGULAR TIP WITH RULER AND LABELS: Brand: DEVON

## (undated) DEVICE — BANDAGE COBAN 4 IN COMPR W4INXL5YD FOAM COHESIVE QUIK STK SELF ADH SFT

## (undated) DEVICE — (D)PREP SKN CHLRAPRP APPL 26ML -- CONVERT TO ITEM 371833

## (undated) DEVICE — INTENDED FOR TISSUE SEPARATION, AND OTHER PROCEDURES THAT REQUIRE A SHARP SURGICAL BLADE TO PUNCTURE OR CUT.: Brand: BARD-PARKER ® STAINLESS STEEL BLADES

## (undated) DEVICE — WATERPROOF, BACTERIA PROOF DRESSING WITH ABSORBENT SEE THROUGH PAD: Brand: OPSITE POST-OP VISIBLE 15X10CM CTN 20

## (undated) DEVICE — Device: Brand: JELCO

## (undated) DEVICE — SET ENDOSCP FIX ACL FOR BNE TEND RECON DISP VERSITOMIC

## (undated) DEVICE — SURGICAL PROCEDURE PACK BASIC ST FRANCIS

## (undated) DEVICE — KNEE ARTHRO ACL DR POSTA: Brand: MEDLINE INDUSTRIES, INC.

## (undated) DEVICE — [RESECTOR CUTTER, ARTHROSCOPIC SHAVER BLADE,  DO NOT RESTERILIZE,  DO NOT USE IF PACKAGE IS DAMAGED,  KEEP DRY,  KEEP AWAY FROM SUNLIGHT]: Brand: FORMULA

## (undated) DEVICE — SOLUTION IRRIG 3000ML 0.9% SOD CHL FLX CONT 0797208] ICU MEDICAL INC]

## (undated) DEVICE — SET IRRIG L94IN ID0.281IN W/ 4.5IN DST FLX CONN 2 LD ON OFF

## (undated) DEVICE — SUTURE VCRL SZ 2-0 L27IN ABSRB UD L26MM CT-2 1/2 CIR J269H

## (undated) DEVICE — T-DRAPE,EXTREMITY,STERILE: Brand: MEDLINE

## (undated) DEVICE — SUTURE NONABSORBABLE BRAIDED 5-0 30 IN ETHBND EXCEL D7809

## (undated) DEVICE — SUTURE PROL SZ 2-0 L18IN NONABSORBABLE BLU FS L26MM 3/8 CIR 8685H

## (undated) DEVICE — CANISTER SUCT C13L L VOL W/OUT TBNG DISP FOR USE W/

## (undated) DEVICE — CANNULA ARTHSCP L9MM CLR DISP SEAL TRAC

## (undated) DEVICE — ZIMMER® STERILE DISPOSABLE TOURNIQUET CUFF WITH PROTECTIVE SLEEVE, DUAL PORT, SINGLE BLADDER, 34 IN. (86 CM)

## (undated) DEVICE — SUTURE MCRYL SZ 3-0 L27IN ABSRB UD L19MM PS-2 3/8 CIR PRIM Y427H

## (undated) DEVICE — MASTISOL ADHESIVE LIQ 2/3ML

## (undated) DEVICE — PAD,ABDOMINAL,5"X9",ST,LF,25/BX: Brand: MEDLINE INDUSTRIES, INC.

## (undated) DEVICE — RAP-PAC LTX FREE: Brand: RAP-PAC

## (undated) DEVICE — SUTURE VCRL SZ 0 L27IN ABSRB VLT L26MM CT-2 1/2 CIR J334H

## (undated) DEVICE — Q-FIX REUSABLE 2.8MM PATHFINDER OBTURATOR: Brand: Q-FIX